# Patient Record
Sex: MALE | Race: WHITE | NOT HISPANIC OR LATINO | Employment: OTHER | URBAN - METROPOLITAN AREA
[De-identification: names, ages, dates, MRNs, and addresses within clinical notes are randomized per-mention and may not be internally consistent; named-entity substitution may affect disease eponyms.]

---

## 2024-08-27 ENCOUNTER — HOSPITAL ENCOUNTER (OUTPATIENT)
Facility: HOSPITAL | Age: 69
Setting detail: RADIATION/ONCOLOGY SERIES
End: 2024-08-27
Payer: MEDICARE

## 2024-08-30 ENCOUNTER — OFFICE VISIT (OUTPATIENT)
Age: 69
End: 2024-08-30
Payer: MEDICARE

## 2024-08-30 VITALS
DIASTOLIC BLOOD PRESSURE: 68 MMHG | BODY MASS INDEX: 32.29 KG/M2 | HEIGHT: 69 IN | WEIGHT: 218 LBS | SYSTOLIC BLOOD PRESSURE: 102 MMHG | TEMPERATURE: 98.1 F | HEART RATE: 92 BPM | OXYGEN SATURATION: 97 % | RESPIRATION RATE: 14 BRPM

## 2024-08-30 DIAGNOSIS — C61 PROSTATE CANCER: Primary | ICD-10-CM

## 2024-08-30 PROCEDURE — G0463 HOSPITAL OUTPT CLINIC VISIT: HCPCS

## 2024-08-30 RX ORDER — LEVOCETIRIZINE DIHYDROCHLORIDE 5 MG/1
1 TABLET, FILM COATED ORAL DAILY
COMMUNITY
Start: 2024-07-08

## 2024-08-30 RX ORDER — TAMSULOSIN HYDROCHLORIDE 0.4 MG/1
1 CAPSULE ORAL DAILY
COMMUNITY
Start: 2024-07-08

## 2024-08-30 RX ORDER — ASPIRIN 81 MG/1
1 TABLET ORAL DAILY
COMMUNITY

## 2024-08-30 RX ORDER — OMEPRAZOLE 40 MG/1
1 CAPSULE, DELAYED RELEASE ORAL DAILY
COMMUNITY
Start: 2024-07-08

## 2024-08-30 RX ORDER — LISINOPRIL 40 MG/1
1 TABLET ORAL DAILY
COMMUNITY

## 2024-08-30 RX ORDER — FLUTICASONE PROPIONATE 50 MCG
SPRAY, SUSPENSION (ML) NASAL
COMMUNITY
Start: 2024-06-19

## 2024-08-30 RX ORDER — PAROXETINE 30 MG/1
1 TABLET, FILM COATED ORAL DAILY
COMMUNITY

## 2024-08-30 NOTE — PROGRESS NOTES
CONSULTATION NOTE      :                                                          1955  DATE OF CONSULTATION:                       2024   REQUESTING PHYSICIAN:                   Peng Cortez MD  REASON FOR CONSULTATION:           Prostate cancer  - Stage IIC (cT1c, cN0, cM0, PSA: 9.1, Grade Group: 4)         BRIEF HISTORY:  The patient is a very pleasant 68 y.o. male  with recent diagnosis of prostate cancer.  He presented with rising PSA values of 7.7 ng/ml on 2024 and 9.13 ng/ml on 3/28/2024.  4K score showed high risk.  MRI 2024 showed a heterogeneous 47 g prostate.  There was an 8 x 7 mm PI-RADS 4 suspicious index lesion at the lateral right peripheral zone at mid gland.  MRI targeted biopsy was performed 2024.  The region of interest at the right anterior lateral gland showed prostatic adenocarcinoma, [place]'s grade 4+4 = 8 involving 3 out of 3 cores, 80% tissue.  No evidence of perineural invasion.  An additional 1 out of 5 random cores from the right lobe contained Reeves's 4+4 = 8 also from the right anterior lateral gland involving 60% tissue.  0 out of 5 random cores from the left lobe contained tumor.  Nuclear medicine bone scan showed no evidence of metastatic disease.    He is otherwise fairly healthy and should have predicted longevity greater than 10 years.  He is interested in definitive nonsurgical treatment and would not like to undergo a long course of treatment with radiotherapy since caring for his wife who has other medical issues.    Allergy: No Known Allergies    Social History:   Social History     Socioeconomic History    Marital status:    Tobacco Use    Smoking status: Never    Smokeless tobacco: Current     Types: Chew    Tobacco comments:     3 chews per day x 20 years   Vaping Use    Vaping status: Never Used   Substance and Sexual Activity    Alcohol use: Not Currently    Drug use: Never    Sexual activity: Defer     Partners: Female  "      Past Medical History:   Past Medical History:   Diagnosis Date    Arthritis     Diabetes mellitus     Diabetes mellitus     History of bladder problems     History of kidney infection     Hypertension     Prostate cancer        Family History: family history includes Cancer in his brother.     Surgical History:   Past Surgical History:   Procedure Laterality Date    COLONOSCOPY  2022    PROSTATE BIOPSY  07/2024        Review of Systems:   Review of Systems   Constitutional:  Positive for fatigue and unexpected weight change (5 pounds in 2 month).   HENT:   Positive for hearing loss and tinnitus.         Sinus drainage     Genitourinary:  Positive for difficulty urinating (hesitancy, urgency,), frequency and nocturia.         Flank pain     Musculoskeletal:  Positive for arthralgias, back pain and myalgias.   Skin:  Positive for itching and rash.   Neurological:  Positive for numbness.   Psychiatric/Behavioral:          Memory loss           Objective   VITAL SIGNS:   Vitals:    08/30/24 1003   BP: 102/68   Pulse: 92   Resp: 14   Temp: 98.1 °F (36.7 °C)   TempSrc: Oral   SpO2: 97%   Weight: 98.9 kg (218 lb)   Height: 175.3 cm (69\")   PainSc:   3   PainLoc: Back        Karnofsky score: 100     IPSS Questionnaire (AUA-7):  Over the past month…    1)  Incomplete Emptying  How often have you had a sensation of not emptying your bladder?  3 - About half the time   2)  Frequency  How often have you had to urinate less than every two hours? 3 - About half the time   3)  Intermittency  How often have you found you stopped and started again several times when you urinated?  2 - Less than half the time   4) Urgency  How often have you found it difficult to postpone urination?  1 - Less than 1 time in 5   5) Weak Stream  How often have you had a weak urinary stream?  1 - Less than 1 time in 5   6) Straining  How often have you had to push or strain to begin urination?  1 - Less than 1 time in 5   7) Nocturia  How many " times did you typically get up at night to urinate?  2 - 2 times   Total Score:  13       Quality of life due to urinary symptoms:  If you were to spend the rest of your life with your urinary condition the way it is now, how would you feel about that? 4-Mostly Dissatisfied   Urine Leakage (Incontinence) 0-No Leakage     Sexual Health Inventory  Current Status    1)  How do you rate your confidence that you could achieve and keep an erection? 1-Very Low   2) When you had erections with sexual stimulation, how often were your erections hard enough for penetration (entering your partner)? 3-Sometime (about half the time)   3)  During sexual intercourse, how often were you able to maintain your erection after you had penetrated (entered) into your partner? 2-A few times (much less than half the time)   4) During sexual intercourse, how difficult was it to maintain your erection to completion of intercourse? 2-Very difficult   5) When you attempted sexual intercourse, how often was it satisfactory to you? 2-A few times (much less than half the time)   Total Score: 14       Bowel Health Inventory  Current Status: 0-No problems, no rectal bleeding, no discharge, less then 5 bowel movements a day         Physical Exam:   Physical Exam  Vitals and nursing note reviewed.   Constitutional:       Appearance: He is well-developed.   HENT:      Head: Normocephalic and atraumatic.   Cardiovascular:      Rate and Rhythm: Normal rate and regular rhythm.      Heart sounds: Normal heart sounds. No murmur heard.  Pulmonary:      Effort: Pulmonary effort is normal.      Breath sounds: Normal breath sounds. No wheezing or rales.   Abdominal:      General: Bowel sounds are normal. There is no distension.      Palpations: Abdomen is soft.      Tenderness: There is no abdominal tenderness.   Genitourinary:     Prostate: Enlarged (40 cc, symmetrically enlarged, smooth, no nodules or induration.  Seminal vesicles are nonpalpable.). Not  tender and no nodules present.      Rectum: No mass, tenderness, anal fissure, external hemorrhoid or internal hemorrhoid. Normal anal tone.   Musculoskeletal:         General: No tenderness. Normal range of motion.      Cervical back: Normal range of motion and neck supple.   Lymphadenopathy:      Cervical: No cervical adenopathy.      Upper Body:      Right upper body: No supraclavicular adenopathy.      Left upper body: No supraclavicular adenopathy.   Skin:     General: Skin is warm and dry.   Neurological:      Mental Status: He is alert and oriented to person, place, and time.      Sensory: No sensory deficit.   Psychiatric:         Behavior: Behavior normal.         Thought Content: Thought content normal.         Judgment: Judgment normal.              The following portions of the patient's history were reviewed and updated as appropriate: allergies, current medications, past family history, past medical history, past social history, past surgical history, and problem list.    Assessment:   Assessment      Prostate cancer, [place]'s 4+4 = 8, clinical stage IIC (T1c, M0, M0) PSA 9.13 ng/ml.  He has a small focus of higher grade neoplasm confined within the prostate gland.  We reviewed the radiotherapy treatment options using stereotactic body radiotherapy versus intensity-modulated radiotherapy or brachytherapy.  Patient would like to proceed with stereotactic body radiotherapy.  CyberKnife treatment procedures been reviewed in detail.  All questions were answered.  Form consent was obtained.    RECOMMENDATIONS: Gold seed fiducial placement will be arranged.  Patient will subsequently return for treatment planning CT and MRI pelvis.  The prostate gland and proximal seminal vesicles will receive 5 fractions of 7 Gray each, delivered on every other day treatment schedule.    I spent a total of 55 minutes on todays visit, with more than 40 minutes in direct face to face communication, and the remainder of the  time spent in reviewing the relevant history, records, available imaging, and for documentation.    Follow Up:   Return in about 4 weeks (around 9/27/2024) for Office Visit.  Diagnoses and all orders for this visit:    1. Prostate cancer (Primary)    Other orders  -     LABS SCANNED  -     SCANNED PATHOLOGY  -     IMAGING SCANNED  -     IMAGING SCANNED  -     IMAGING SCANNED  -     IMAGING SCANNED         Torsten Nguyen MD

## 2024-09-16 DIAGNOSIS — C61 PROSTATE CANCER: Primary | ICD-10-CM

## 2024-09-30 ENCOUNTER — DOCUMENTATION (OUTPATIENT)
Dept: OTHER | Facility: HOSPITAL | Age: 69
End: 2024-09-30
Payer: MEDICARE

## 2024-09-30 NOTE — PROGRESS NOTES
Distress Screening Follow-up    Name: Cullen Harris    : 1955    Diagnosis: Prostate Cancer    Location of Distress Screening: Radiation Oncology    Distress Level: 5 (2024 11:00 AM)      Physical Concerns:  Fatigue: Y  Tobacco use: Y  Memory or concentration: Y  Loss or change of physical abilities: Y      Emotional Concerns:  Loss of interest or enjoyment: Y     Interventions:        Comments:  RUSS called to follow up with pt regarding recent distress screen results. SW left a VM with pt introducing self, and offering additional support. SW provided call back number, ([phone]) and encouraged pt to reach out at his convenience.     SW will remain available to offer support.     JAIME Khan  Oncology Social Worker

## 2024-10-10 ENCOUNTER — DOCUMENTATION (OUTPATIENT)
Dept: NUTRITION | Facility: HOSPITAL | Age: 69
End: 2024-10-10
Payer: MEDICARE

## 2024-10-11 ENCOUNTER — HOSPITAL ENCOUNTER (OUTPATIENT)
Dept: RADIATION ONCOLOGY | Facility: HOSPITAL | Age: 69
Setting detail: RADIATION/ONCOLOGY SERIES
Discharge: HOME | End: 2024-10-11
Payer: MEDICARE

## 2024-10-11 ENCOUNTER — OFFICE VISIT (OUTPATIENT)
Dept: RADIATION ONCOLOGY | Facility: HOSPITAL | Age: 69
End: 2024-10-11
Payer: MEDICARE

## 2024-10-11 DIAGNOSIS — C61 PROSTATE CANCER: Primary | ICD-10-CM

## 2024-10-11 NOTE — PROGRESS NOTES
RE-EVALUATION    PATIENT:                                                      Cullen Harris  :                                                          1955  DATE:                          10/11/2024   DIAGNOSIS:     Prostate cancer  - Stage IIC (cT1c, cN0, cM0, PSA: 9.1, Grade Group: 4)    This visit has been converted to a telehealth virtual visit, the patient's preferred method for today's visit.  Total time of discussion was 15 minutes.  The patient has given verbal consent.       BRIEF HISTORY:  The patient is a very pleasant 69 y.o. male with recent diagnosis of prostate cancer.  He presented with rising PSA values of 7.7 ng/ml on 2024 and 9.13 ng/ml on 3/28/2024.  4K score showed high risk.  MRI 2024 showed a heterogeneous 47 g prostate.  There was an 8 x 7 mm PI-RADS 4 suspicious index lesion at the lateral right peripheral zone at mid gland.  MRI targeted biopsy was performed 2024.  The region of interest at the right anterior lateral gland showed prostatic adenocarcinoma, [place]'s grade 4+4 = 8 involving 3 out of 3 cores, 80% tissue.  No evidence of perineural invasion.  An additional 1 out of 5 random cores from the right lobe contained [place]'s 4+4 = 8 also from the right anterior lateral gland involving 60% tissue.  0 out of 5 random cores from the left lobe contained tumor.  Nuclear medicine bone scan showed no evidence of metastatic disease.  He is otherwise fairly healthy and should have predicted longevity greater than 10 years.  He is interested in definitive nonsurgical treatment and would not like to undergo a long course of treatment with radiotherapy since caring for his wife who has other medical issues.  He chose to undergo definitive treatment with CyberKnife stereotactic body radiotherapy.  He tolerated gold seed fiducial placement without difficulty.  No change in voiding.  No hematuria.  No dysuria.  He has had no other change in health since informed consent was  obtained on 8/30/2024.    He remains a good candidate for SBRT.       IPSS Questionnaire (AUA-7):  Over the past month…     1)  Incomplete Emptying  How often have you had a sensation of not emptying your bladder?  3 - About half the time   2)  Frequency  How often have you had to urinate less than every two hours? 3 - About half the time   3)  Intermittency  How often have you found you stopped and started again several times when you urinated?  2 - Less than half the time   4) Urgency  How often have you found it difficult to postpone urination?  1 - Less than 1 time in 5   5) Weak Stream  How often have you had a weak urinary stream?  1 - Less than 1 time in 5   6) Straining  How often have you had to push or strain to begin urination?  1 - Less than 1 time in 5   7) Nocturia  How many times did you typically get up at night to urinate?  2 - 2 times   Total Score:  13         Quality of life due to urinary symptoms:  If you were to spend the rest of your life with your urinary condition the way it is now, how would you feel about that? 4-Mostly Dissatisfied   Urine Leakage (Incontinence) 0-No Leakage      Sexual Health Inventory  Current Status     1)  How do you rate your confidence that you could achieve and keep an erection? 1-Very Low   2) When you had erections with sexual stimulation, how often were your erections hard enough for penetration (entering your partner)? 3-Sometime (about half the time)   3)  During sexual intercourse, how often were you able to maintain your erection after you had penetrated (entered) into your partner? 2-A few times (much less than half the time)   4) During sexual intercourse, how difficult was it to maintain your erection to completion of intercourse? 2-Very difficult   5) When you attempted sexual intercourse, how often was it satisfactory to you? 2-A few times (much less than half the time)   Total Score: 14         Bowel Health Inventory  Current Status: 0-No problems, no  rectal bleeding, no discharge, less then 5 bowel movements a day        No Known Allergies    Review of Systems - Oncology   Constitutional:  Positive for fatigue and unexpected weight change (5 pounds in 2 month).   HENT:   Positive for hearing loss and tinnitus.         Sinus drainage  Genitourinary:  Positive for difficulty urinating (hesitancy, urgency,), frequency and nocturia.    Musculoskeletal:  Positive for arthralgias, back pain and myalgias.   Skin:  Positive for itching and rash.   Neurological:  Positive for numbness.        %    Objective       Physical Exam  Pulmonary:      Respirations even, unlabored. No audible wheezing or cough.  Neurological:      A+Ox4, conversant, answers questions appropriately.  Psychiatric:     Judgement, affect, and decision-making WNL.    Limited physical exam as visit was conducted remotely via telephone.           The following portions of the patient's history were reviewed and updated as appropriate: allergies, current medications, past family history, past medical history, past social history, past surgical history, and problem list.    Diagnoses and all orders for this visit:    Prostate cancer        IMPRESSION:    Prostate cancer, [place]'s 4+4 = 8, clinical stage IIC (T1c, M0, M0) PSA 9.13 ng/ml.  He has a small focus of higher grade neoplasm confined within the prostate gland.    RECOMMENDATIONS:  Return for treatment planning CT and MRI pelvis which will be performed 10/14/2024.  The prostate gland and proximal seminal vesicles will receive 5 fractions of 7 Gray each, delivered on every other day treatment schedule, on the CyberKnife.   He verbalizes understanding of all recommended dietary and bowel prep, activity restrictions, and continuation of alpha blocker.           CAMMIE Mauricio    A total of 30 minutes was spent on today's encounter, with 15 minutes in virtual communication with the patient via telephone, and the remainder of the time  spent in reviewing the relevant history, records, available imaging, and for documentation.

## 2024-10-14 ENCOUNTER — HOSPITAL ENCOUNTER (OUTPATIENT)
Dept: RADIATION ONCOLOGY | Facility: HOSPITAL | Age: 69
Discharge: HOME | End: 2024-10-14
Payer: MEDICARE

## 2024-10-14 ENCOUNTER — HOSPITAL ENCOUNTER (OUTPATIENT)
Dept: MRI IMAGING | Facility: HOSPITAL | Age: 69
Discharge: HOME | End: 2024-10-14
Admitting: RADIOLOGY
Payer: MEDICARE

## 2024-10-14 DIAGNOSIS — C61 PROSTATE CANCER: ICD-10-CM

## 2024-10-14 PROCEDURE — 72195 MRI PELVIS W/O DYE: CPT

## 2024-10-14 PROCEDURE — 77399 UNLISTED PX MED RADJ PHYSICS: CPT | Performed by: RADIOLOGY

## 2024-10-16 ENCOUNTER — DOCUMENTATION (OUTPATIENT)
Dept: RADIATION ONCOLOGY | Facility: HOSPITAL | Age: 69
End: 2024-10-16
Payer: MEDICARE

## 2024-10-16 DIAGNOSIS — C61 PROSTATE CANCER: Primary | ICD-10-CM

## 2024-10-16 NOTE — PROGRESS NOTES
Patient has a diagnosis of clinical localized but higher grade prostate cancer.  He chose to undergo definitive treatment with CyberKnife stereotactic body radiotherapy.  He recently underwent treatment planning imaging studies including MRI pelvis.  This showed:    Enlarging right obturator lymph node with somewhat rounded morphology measuring 1.3 x 1.1 cm previously 1 x 0.6 cm on 4/12/2024.   This was concerning for possible regional elan metastasis.  I discussed this with the patient.    Plan:  PSMA PET/CT will be performed prior to stereotactic body radiotherapy treatment of the prostate gland, for more complete staging and evaluation of the pelvic lymph node.

## 2024-10-17 ENCOUNTER — TELEPHONE (OUTPATIENT)
Dept: RADIATION ONCOLOGY | Facility: HOSPITAL | Age: 69
End: 2024-10-17
Payer: MEDICARE

## 2024-10-17 NOTE — TELEPHONE ENCOUNTER
Attempted to contact pt regarding upcoming appointments.  Left detailed message.  Instructed to call back with any questions. MyChart message also sent to pt.

## 2024-10-31 ENCOUNTER — HOSPITAL ENCOUNTER (OUTPATIENT)
Facility: HOSPITAL | Age: 69
Discharge: HOME | End: 2024-10-31
Payer: MEDICARE

## 2024-10-31 DIAGNOSIS — C61 PROSTATE CANCER: ICD-10-CM

## 2024-10-31 PROCEDURE — 0 PIFLUFOLASTAT F 18 9 MCI SOLUTION PREFILLED SYRINGE: Performed by: RADIOLOGY

## 2024-10-31 PROCEDURE — A9595 PIFLUFOLASTAT F 18 9 MCI SOLUTION PREFILLED SYRINGE: HCPCS | Performed by: RADIOLOGY

## 2024-10-31 PROCEDURE — 78815 PET IMAGE W/CT SKULL-THIGH: CPT

## 2024-10-31 RX ADMIN — PIFLUFOLASTAT F-18 1 DOSE: 80 INJECTION INTRAVENOUS at 13:39

## 2024-11-01 ENCOUNTER — HOSPITAL ENCOUNTER (OUTPATIENT)
Dept: RADIATION ONCOLOGY | Facility: HOSPITAL | Age: 69
Setting detail: RADIATION/ONCOLOGY SERIES
Discharge: HOME | End: 2024-11-01
Payer: MEDICARE

## 2024-11-05 ENCOUNTER — OFFICE VISIT (OUTPATIENT)
Dept: RADIATION ONCOLOGY | Facility: HOSPITAL | Age: 69
End: 2024-11-05
Payer: MEDICARE

## 2024-11-05 VITALS
RESPIRATION RATE: 20 BRPM | BODY MASS INDEX: 32.43 KG/M2 | HEART RATE: 61 BPM | TEMPERATURE: 97.2 F | WEIGHT: 219.6 LBS | DIASTOLIC BLOOD PRESSURE: 75 MMHG | SYSTOLIC BLOOD PRESSURE: 120 MMHG | OXYGEN SATURATION: 95 %

## 2024-11-05 DIAGNOSIS — C61 PROSTATE CANCER: Primary | ICD-10-CM

## 2024-11-05 PROCEDURE — G0463 HOSPITAL OUTPT CLINIC VISIT: HCPCS

## 2024-11-05 NOTE — PROGRESS NOTES
RE-EVALUATION    PATIENT:                                                      Cullen Harris  :                                                          1955  DATE:                          2024   DIAGNOSIS:     Prostate cancer  - Stage IIC (cT1c, cN0, cM0, PSA: 9.1, Grade Group: 4)         BRIEF HISTORY:  The patient is a very pleasant 69 y.o. male  with high risk prostate cancer.  Patient had wanted to undergo stereotactic body radiotherapy as his definitive treatment.  He underwent placement of gold fiducials.  The treatment planning imaging including MRI pelvis identified a suspicious right pelvic lymph node.  PSMA PET/CT evaluation 10/31/2024 showed uptake in the right peripheral zone of the prostate gland with SUV 73.9.  There was also hypermetabolism in a solitary 0.9 cm right external iliac lymph node, SUV 84 consistent with lymph node metastasis.    No Known Allergies    Review of Systems   Constitutional:  Positive for fatigue.   HENT:   Positive for hearing loss.    Genitourinary:  Positive for frequency.    Musculoskeletal:  Positive for back pain.   All other systems reviewed and are negative.        IPSS Questionnaire (AUA-7):  Over the past month…     1)  Incomplete Emptying  How often have you had a sensation of not emptying your bladder?  3 - About half the time   2)  Frequency  How often have you had to urinate less than every two hours? 3 - About half the time   3)  Intermittency  How often have you found you stopped and started again several times when you urinated?  2 - Less than half the time   4) Urgency  How often have you found it difficult to postpone urination?  1 - Less than 1 time in 5   5) Weak Stream  How often have you had a weak urinary stream?  1 - Less than 1 time in 5   6) Straining  How often have you had to push or strain to begin urination?  1 - Less than 1 time in 5   7) Nocturia  How many times did you typically get up at night to urinate?  2 - 2 times   Total  Score:  13         Quality of life due to urinary symptoms:  If you were to spend the rest of your life with your urinary condition the way it is now, how would you feel about that? 4-Mostly Dissatisfied   Urine Leakage (Incontinence) 0-No Leakage      Sexual Health Inventory  Current Status     1)  How do you rate your confidence that you could achieve and keep an erection? 1-Very Low   2) When you had erections with sexual stimulation, how often were your erections hard enough for penetration (entering your partner)? 3-Sometime (about half the time)   3)  During sexual intercourse, how often were you able to maintain your erection after you had penetrated (entered) into your partner? 2-A few times (much less than half the time)   4) During sexual intercourse, how difficult was it to maintain your erection to completion of intercourse? 2-Very difficult   5) When you attempted sexual intercourse, how often was it satisfactory to you? 2-A few times (much less than half the time)   Total Score: 14         Bowel Health Inventory  Current Status: 0-No problems, no rectal bleeding, no discharge, less then 5 bowel movements a day              Objective   VITAL SIGNS:   Vitals:    11/05/24 1107   BP: 120/75   Pulse: 61   Resp: 20   Temp: 97.2 °F (36.2 °C)   TempSrc: Skin   SpO2: 95%   Weight: 99.6 kg (219 lb 9.6 oz)   PainSc: 0-No pain            KSP %:  80-90    Physical Exam  Vitals and nursing note reviewed.   Constitutional:       Appearance: He is well-developed.   HENT:      Head: Normocephalic and atraumatic.   Cardiovascular:      Rate and Rhythm: Normal rate and regular rhythm.      Heart sounds: Normal heart sounds. No murmur heard.  Pulmonary:      Effort: Pulmonary effort is normal.      Breath sounds: Normal breath sounds. No wheezing or rales.   Abdominal:      General: Bowel sounds are normal. There is no distension.      Palpations: Abdomen is soft.      Tenderness: There is no abdominal tenderness.    Musculoskeletal:         General: No tenderness. Normal range of motion.      Cervical back: Normal range of motion and neck supple.   Lymphadenopathy:      Cervical: No cervical adenopathy.      Upper Body:      Right upper body: No supraclavicular adenopathy.      Left upper body: No supraclavicular adenopathy.   Skin:     General: Skin is warm and dry.   Neurological:      Mental Status: He is alert and oriented to person, place, and time.      Sensory: No sensory deficit.   Psychiatric:         Behavior: Behavior normal.         Thought Content: Thought content normal.         Judgment: Judgment normal.              The following portions of the patient's history were reviewed and updated as appropriate: allergies, current medications, past family history, past medical history, past social history, past surgical history, and problem list.    Diagnoses and all orders for this visit:    Prostate cancer      IMPRESSION: Prostate cancer, [place]'s 4+4 = 8, clinical stage based on MRI and PSMA PET showing a solitary right external iliac lymph node metastasis is now CARLY (T1c, N1, M0), PSA 9.13 ng/ml.  This would be best treated with moderately hypofractionated IMRT pelvic irradiation and concurrent short course androgen ablation.  Patient was reconsented for IMRT radiotherapy.    RECOMMENDATIONS: 6-month LHRH agonist injection will be coordinated.  The prostate gland and pathologic right iliac lymph node will be treated to a dose of 70 Gray delivered in 28 daily fractions over 5 and half weeks.  Concurrently, the remainder of the pelvic lymph nodes will receive a minimum dose of 50 [place].  Patient would like to be treated in the [place] location, closer to home.  Simulation will be arranged.         Torsten Nguyen MD    Approximate 35 minutes was spent reviewing records, imaging studies, discussing with patient, and for documentation.

## 2024-11-06 ENCOUNTER — TELEPHONE (OUTPATIENT)
Age: 69
End: 2024-11-06
Payer: MEDICARE

## 2024-11-06 NOTE — TELEPHONE ENCOUNTER
Patient called to inform nursing that he is scheduled to receive a LHRH injection with Dr. Bowden on 11/12/24. SIM scheduled for 11/14/24 @ [place] location at 2:00pm. Patient understands location for appt.

## 2024-11-12 ENCOUNTER — HOSPITAL ENCOUNTER (OUTPATIENT)
Facility: HOSPITAL | Age: 69
Setting detail: RADIATION/ONCOLOGY SERIES
End: 2024-11-12
Payer: MEDICARE

## 2024-11-14 ENCOUNTER — HOSPITAL ENCOUNTER (OUTPATIENT)
Facility: HOSPITAL | Age: 69
Discharge: HOME | End: 2024-11-14

## 2024-11-14 PROCEDURE — 77399 UNLISTED PX MED RADJ PHYSICS: CPT | Performed by: RADIOLOGY

## 2024-11-15 ENCOUNTER — DOCUMENTATION (OUTPATIENT)
Dept: OTHER | Facility: HOSPITAL | Age: 69
End: 2024-11-15
Payer: MEDICARE

## 2024-11-15 NOTE — PROGRESS NOTES
SW contacted to pt to provide support, introductions, and follow up concerning lodging/transportation for upcoming treatments. SW provided introductions due to pt changing his treatment from [facility] to [facility]. Pt communicated to SW that he just had his markers placed and that he has been doing well. SW informed pt he is eligible to stay at [facility] if he would like to remain in [place] for his treatments. Pt stated he would rather drive to than stay in [place]. SW voiced understanding and encouraged pt to reach out if he changes his mind and would like to stay at [facility] in the future. Pt thanked RUSS for the call and denied any needs at this time. SW provided contact information and pt agreed to reach out should any needs arise. RUSS will continue to be available for ongoing support and assistance.

## 2024-11-20 PROCEDURE — 77338 DESIGN MLC DEVICE FOR IMRT: CPT | Performed by: RADIOLOGY

## 2024-11-20 PROCEDURE — 77293 RESPIRATOR MOTION MGMT SIMUL: CPT | Performed by: RADIOLOGY

## 2024-11-20 PROCEDURE — 77301 RADIOTHERAPY DOSE PLAN IMRT: CPT | Performed by: RADIOLOGY

## 2024-11-20 PROCEDURE — 77300 RADIATION THERAPY DOSE PLAN: CPT | Performed by: RADIOLOGY

## 2024-11-25 ENCOUNTER — HOSPITAL ENCOUNTER (OUTPATIENT)
Facility: HOSPITAL | Age: 69
Discharge: HOME | End: 2024-11-25
Payer: MEDICARE

## 2024-11-25 LAB
RAD ONC ARIA COURSE ID: 1
RAD ONC ARIA COURSE INTENT: NORMAL
RAD ONC ARIA COURSE LAST TREATMENT DATE: NORMAL
RAD ONC ARIA COURSE START DATE: NORMAL
RAD ONC ARIA COURSE TREATMENT ELAPSED DAYS: 0
RAD ONC ARIA FIRST TREATMENT DATE: NORMAL
RAD ONC ARIA PLAN FRACTIONS TREATED TO DATE: 1
RAD ONC ARIA PLAN ID: NORMAL
RAD ONC ARIA PLAN NAME: NORMAL
RAD ONC ARIA PLAN PRESCRIBED DOSE PER FRACTION: 2.5 GY
RAD ONC ARIA PLAN PRIMARY REFERENCE POINT: NORMAL
RAD ONC ARIA PLAN TOTAL FRACTIONS PRESCRIBED: 28
RAD ONC ARIA PLAN TOTAL PRESCRIBED DOSE: 7000 CGY
RAD ONC ARIA REFERENCE POINT DOSAGE GIVEN TO DATE: 1.8 GY
RAD ONC ARIA REFERENCE POINT DOSAGE GIVEN TO DATE: 2.5 GY
RAD ONC ARIA REFERENCE POINT ID: NORMAL
RAD ONC ARIA REFERENCE POINT ID: NORMAL
RAD ONC ARIA REFERENCE POINT SESSION DOSAGE GIVEN: 1.8 GY
RAD ONC ARIA REFERENCE POINT SESSION DOSAGE GIVEN: 2.5 GY

## 2024-11-25 PROCEDURE — 77385: CPT | Performed by: RADIOLOGY

## 2024-11-26 ENCOUNTER — HOSPITAL ENCOUNTER (OUTPATIENT)
Facility: HOSPITAL | Age: 69
Discharge: HOME | End: 2024-11-26

## 2024-11-26 LAB
RAD ONC ARIA COURSE ID: 1
RAD ONC ARIA COURSE INTENT: NORMAL
RAD ONC ARIA COURSE LAST TREATMENT DATE: NORMAL
RAD ONC ARIA COURSE START DATE: NORMAL
RAD ONC ARIA COURSE TREATMENT ELAPSED DAYS: 1
RAD ONC ARIA FIRST TREATMENT DATE: NORMAL
RAD ONC ARIA PLAN FRACTIONS TREATED TO DATE: 2
RAD ONC ARIA PLAN ID: NORMAL
RAD ONC ARIA PLAN NAME: NORMAL
RAD ONC ARIA PLAN PRESCRIBED DOSE PER FRACTION: 2.5 GY
RAD ONC ARIA PLAN PRIMARY REFERENCE POINT: NORMAL
RAD ONC ARIA PLAN TOTAL FRACTIONS PRESCRIBED: 28
RAD ONC ARIA PLAN TOTAL PRESCRIBED DOSE: 7000 CGY
RAD ONC ARIA REFERENCE POINT DOSAGE GIVEN TO DATE: 3.6 GY
RAD ONC ARIA REFERENCE POINT DOSAGE GIVEN TO DATE: 5 GY
RAD ONC ARIA REFERENCE POINT ID: NORMAL
RAD ONC ARIA REFERENCE POINT ID: NORMAL
RAD ONC ARIA REFERENCE POINT SESSION DOSAGE GIVEN: 1.8 GY
RAD ONC ARIA REFERENCE POINT SESSION DOSAGE GIVEN: 2.5 GY

## 2024-11-26 PROCEDURE — 77385: CPT | Performed by: RADIOLOGY

## 2024-11-27 ENCOUNTER — HOSPITAL ENCOUNTER (OUTPATIENT)
Facility: HOSPITAL | Age: 69
Discharge: HOME | End: 2024-11-27

## 2024-11-27 ENCOUNTER — DOCUMENTATION (OUTPATIENT)
Dept: NUTRITION | Facility: HOSPITAL | Age: 69
End: 2024-11-27
Payer: MEDICARE

## 2024-11-27 VITALS — WEIGHT: 218 LBS | BODY MASS INDEX: 32.19 KG/M2

## 2024-11-27 LAB
RAD ONC ARIA COURSE ID: 1
RAD ONC ARIA COURSE INTENT: NORMAL
RAD ONC ARIA COURSE LAST TREATMENT DATE: NORMAL
RAD ONC ARIA COURSE START DATE: NORMAL
RAD ONC ARIA COURSE TREATMENT ELAPSED DAYS: 2
RAD ONC ARIA FIRST TREATMENT DATE: NORMAL
RAD ONC ARIA PLAN FRACTIONS TREATED TO DATE: 3
RAD ONC ARIA PLAN ID: NORMAL
RAD ONC ARIA PLAN NAME: NORMAL
RAD ONC ARIA PLAN PRESCRIBED DOSE PER FRACTION: 2.5 GY
RAD ONC ARIA PLAN PRIMARY REFERENCE POINT: NORMAL
RAD ONC ARIA PLAN TOTAL FRACTIONS PRESCRIBED: 28
RAD ONC ARIA PLAN TOTAL PRESCRIBED DOSE: 7000 CGY
RAD ONC ARIA REFERENCE POINT DOSAGE GIVEN TO DATE: 5.4 GY
RAD ONC ARIA REFERENCE POINT DOSAGE GIVEN TO DATE: 7.5 GY
RAD ONC ARIA REFERENCE POINT ID: NORMAL
RAD ONC ARIA REFERENCE POINT ID: NORMAL
RAD ONC ARIA REFERENCE POINT SESSION DOSAGE GIVEN: 1.8 GY
RAD ONC ARIA REFERENCE POINT SESSION DOSAGE GIVEN: 2.5 GY

## 2024-11-27 PROCEDURE — 77385: CPT | Performed by: RADIOLOGY

## 2024-11-27 RX ORDER — HYDROCHLOROTHIAZIDE 12.5 MG/1
12.5 TABLET ORAL DAILY
COMMUNITY
Start: 2019-01-25

## 2024-11-27 NOTE — PROGRESS NOTES
"Outpatient Oncology Nutrition     Reason for Visit:     Oncology Nutrition Screening and Patient Education    Patient Name:  Cullen Harris    :  1955    MRN:  [id number]    Date of Encounter: 2024    Nutrition Assessment     Diagnosis: Prostate Cancer    Radiation: 70 Hernandez delivered in 28 daily fractions     Patient Active Problem List:    Patient Active Problem List   Diagnosis    Prostate cancer       Food / Nutrition Related History   Patient reports he typically drinks water, diet soda, coffee and sometimes some gatorade.     Hydration Status   Discussed the importance of hydration, reviewed hydrating fluids, and recommended he increase his intake.    Goal: 100+ ounces     Enteral Feeding       Anthropometric Measurements     Height:    Ht Readings from Last 1 Encounters:   24 175.3 cm (69\")       Weight:    Wt Readings from Last 1 Encounters:   24 99.6 kg (219 lb 9.6 oz)       BMI:  32.43 Obese    Weight Change:  24- 218#  24- 219#  24- 218#    Review of Lab Data (Time Frame - 1 month / 2 month)   No recent lab work    Medication Review   Reviewed MAR 11/27/24; metformin, vit D    Nutrition Focused Physical Findings       Nutrition Impact Symptoms      Fatigue      Physical Activity      Normal with no limitations    Current Nutritional Intake     Oral diet: Regular    Oral nutritional supplements: none indicated    Intake: oral intake has been normal    Malnutrition Risk Assessment     Recent weight loss over the past 6 months:  0 = No    Eating poorly because of a decreased appetite:  0 = No    Malnutrition Screening Score:     MST = 0 or 1 Patient not at risk for malnutrition    Nutrition Diagnosis     Problem    Etiology    Signs / Symptoms      Nutrition Intervention   Reviewed the importance of good nutrition during his treatment course focusing on adequate calorie, protein, nutrient and fluid intake. Advised him to be consuming smaller more frequent meals/snacks " throughout the day to aid with potential nausea management. Emphasized the importance of protein and its role in the diet; reviewed high protein foods; and recommended he have a protein source at each meal/snack. He denies the need for written diet materials at this time.      Goal   To achieve adequate nutritional and hydration intake.    To aid with nutrition impact symptom management as needed.    Monitoring / Evaluation   Answered his questions and he voiced understanding of information discussed.  RD's contact information provided and encouraged to call with questions. Will follow up as indicated.    Ana Reynoso RD, LD

## 2024-12-02 ENCOUNTER — HOSPITAL ENCOUNTER (OUTPATIENT)
Facility: HOSPITAL | Age: 69
Discharge: HOME | End: 2024-12-02
Payer: MEDICARE

## 2024-12-02 ENCOUNTER — HOSPITAL ENCOUNTER (OUTPATIENT)
Facility: HOSPITAL | Age: 69
Setting detail: RADIATION/ONCOLOGY SERIES
End: 2024-12-02
Payer: MEDICARE

## 2024-12-02 LAB
RAD ONC ARIA COURSE ID: 1
RAD ONC ARIA COURSE INTENT: NORMAL
RAD ONC ARIA COURSE LAST TREATMENT DATE: NORMAL
RAD ONC ARIA COURSE START DATE: NORMAL
RAD ONC ARIA COURSE TREATMENT ELAPSED DAYS: 7
RAD ONC ARIA FIRST TREATMENT DATE: NORMAL
RAD ONC ARIA PLAN FRACTIONS TREATED TO DATE: 4
RAD ONC ARIA PLAN ID: NORMAL
RAD ONC ARIA PLAN NAME: NORMAL
RAD ONC ARIA PLAN PRESCRIBED DOSE PER FRACTION: 2.5 GY
RAD ONC ARIA PLAN PRIMARY REFERENCE POINT: NORMAL
RAD ONC ARIA PLAN TOTAL FRACTIONS PRESCRIBED: 28
RAD ONC ARIA PLAN TOTAL PRESCRIBED DOSE: 7000 CGY
RAD ONC ARIA REFERENCE POINT DOSAGE GIVEN TO DATE: 10 GY
RAD ONC ARIA REFERENCE POINT DOSAGE GIVEN TO DATE: 7.2 GY
RAD ONC ARIA REFERENCE POINT ID: NORMAL
RAD ONC ARIA REFERENCE POINT ID: NORMAL
RAD ONC ARIA REFERENCE POINT SESSION DOSAGE GIVEN: 1.8 GY
RAD ONC ARIA REFERENCE POINT SESSION DOSAGE GIVEN: 2.5 GY

## 2024-12-02 PROCEDURE — 77385: CPT | Performed by: RADIOLOGY

## 2024-12-03 ENCOUNTER — HOSPITAL ENCOUNTER (OUTPATIENT)
Facility: HOSPITAL | Age: 69
Discharge: HOME | End: 2024-12-03

## 2024-12-03 LAB
RAD ONC ARIA COURSE ID: 1
RAD ONC ARIA COURSE INTENT: NORMAL
RAD ONC ARIA COURSE LAST TREATMENT DATE: NORMAL
RAD ONC ARIA COURSE START DATE: NORMAL
RAD ONC ARIA COURSE TREATMENT ELAPSED DAYS: 8
RAD ONC ARIA FIRST TREATMENT DATE: NORMAL
RAD ONC ARIA PLAN FRACTIONS TREATED TO DATE: 5
RAD ONC ARIA PLAN ID: NORMAL
RAD ONC ARIA PLAN NAME: NORMAL
RAD ONC ARIA PLAN PRESCRIBED DOSE PER FRACTION: 2.5 GY
RAD ONC ARIA PLAN PRIMARY REFERENCE POINT: NORMAL
RAD ONC ARIA PLAN TOTAL FRACTIONS PRESCRIBED: 28
RAD ONC ARIA PLAN TOTAL PRESCRIBED DOSE: 7000 CGY
RAD ONC ARIA REFERENCE POINT DOSAGE GIVEN TO DATE: 12.5 GY
RAD ONC ARIA REFERENCE POINT DOSAGE GIVEN TO DATE: 9 GY
RAD ONC ARIA REFERENCE POINT ID: NORMAL
RAD ONC ARIA REFERENCE POINT ID: NORMAL
RAD ONC ARIA REFERENCE POINT SESSION DOSAGE GIVEN: 1.8 GY
RAD ONC ARIA REFERENCE POINT SESSION DOSAGE GIVEN: 2.5 GY

## 2024-12-03 PROCEDURE — 77385: CPT | Performed by: RADIOLOGY

## 2024-12-03 PROCEDURE — 77336 RADIATION PHYSICS CONSULT: CPT | Performed by: RADIOLOGY

## 2024-12-04 ENCOUNTER — HOSPITAL ENCOUNTER (OUTPATIENT)
Facility: HOSPITAL | Age: 69
Discharge: HOME | End: 2024-12-04

## 2024-12-04 LAB
RAD ONC ARIA COURSE ID: 1
RAD ONC ARIA COURSE INTENT: NORMAL
RAD ONC ARIA COURSE LAST TREATMENT DATE: NORMAL
RAD ONC ARIA COURSE START DATE: NORMAL
RAD ONC ARIA COURSE TREATMENT ELAPSED DAYS: 9
RAD ONC ARIA FIRST TREATMENT DATE: NORMAL
RAD ONC ARIA PLAN FRACTIONS TREATED TO DATE: 6
RAD ONC ARIA PLAN ID: NORMAL
RAD ONC ARIA PLAN NAME: NORMAL
RAD ONC ARIA PLAN PRESCRIBED DOSE PER FRACTION: 2.5 GY
RAD ONC ARIA PLAN PRIMARY REFERENCE POINT: NORMAL
RAD ONC ARIA PLAN TOTAL FRACTIONS PRESCRIBED: 28
RAD ONC ARIA PLAN TOTAL PRESCRIBED DOSE: 7000 CGY
RAD ONC ARIA REFERENCE POINT DOSAGE GIVEN TO DATE: 10.8 GY
RAD ONC ARIA REFERENCE POINT DOSAGE GIVEN TO DATE: 15 GY
RAD ONC ARIA REFERENCE POINT ID: NORMAL
RAD ONC ARIA REFERENCE POINT ID: NORMAL
RAD ONC ARIA REFERENCE POINT SESSION DOSAGE GIVEN: 1.8 GY
RAD ONC ARIA REFERENCE POINT SESSION DOSAGE GIVEN: 2.5 GY

## 2024-12-04 PROCEDURE — 77385: CPT | Performed by: RADIOLOGY

## 2024-12-05 ENCOUNTER — HOSPITAL ENCOUNTER (OUTPATIENT)
Facility: HOSPITAL | Age: 69
Discharge: HOME | End: 2024-12-05

## 2024-12-05 ENCOUNTER — DOCUMENTATION (OUTPATIENT)
Dept: NUTRITION | Facility: HOSPITAL | Age: 69
End: 2024-12-05
Payer: MEDICARE

## 2024-12-05 VITALS — BODY MASS INDEX: 32.19 KG/M2 | WEIGHT: 218 LBS

## 2024-12-05 LAB
RAD ONC ARIA COURSE ID: 1
RAD ONC ARIA COURSE INTENT: NORMAL
RAD ONC ARIA COURSE LAST TREATMENT DATE: NORMAL
RAD ONC ARIA COURSE START DATE: NORMAL
RAD ONC ARIA COURSE TREATMENT ELAPSED DAYS: 10
RAD ONC ARIA FIRST TREATMENT DATE: NORMAL
RAD ONC ARIA PLAN FRACTIONS TREATED TO DATE: 7
RAD ONC ARIA PLAN ID: NORMAL
RAD ONC ARIA PLAN NAME: NORMAL
RAD ONC ARIA PLAN PRESCRIBED DOSE PER FRACTION: 2.5 GY
RAD ONC ARIA PLAN PRIMARY REFERENCE POINT: NORMAL
RAD ONC ARIA PLAN TOTAL FRACTIONS PRESCRIBED: 28
RAD ONC ARIA PLAN TOTAL PRESCRIBED DOSE: 7000 CGY
RAD ONC ARIA REFERENCE POINT DOSAGE GIVEN TO DATE: 12.6 GY
RAD ONC ARIA REFERENCE POINT DOSAGE GIVEN TO DATE: 17.5 GY
RAD ONC ARIA REFERENCE POINT ID: NORMAL
RAD ONC ARIA REFERENCE POINT ID: NORMAL
RAD ONC ARIA REFERENCE POINT SESSION DOSAGE GIVEN: 1.8 GY
RAD ONC ARIA REFERENCE POINT SESSION DOSAGE GIVEN: 2.5 GY

## 2024-12-05 PROCEDURE — 77385: CPT | Performed by: RADIOLOGY

## 2024-12-05 NOTE — PROGRESS NOTES
ONC Nutrition    Diagnosis: Prostate Cancer     Radiation: 70 Hernandez delivered in 28 daily fractions     Weight:  8/30/24- 218#  11/5/24- 219#  11/27/24- 218#  12/5/24- 218#    Patient completed 7/28 treatments today.      Patient reports the past week has gone well. Patient reports he's doing better with the small, frequent meals, as well as, his fluid intake. Patient reports no new nutrition impact symptoms at this time.     All questions/concerns addressed at this time.     Will follow as indicated.     Ana Reynoso RD, LD

## 2024-12-06 ENCOUNTER — HOSPITAL ENCOUNTER (OUTPATIENT)
Facility: HOSPITAL | Age: 69
Discharge: HOME | End: 2024-12-06

## 2024-12-06 LAB
RAD ONC ARIA COURSE ID: 1
RAD ONC ARIA COURSE INTENT: NORMAL
RAD ONC ARIA COURSE LAST TREATMENT DATE: NORMAL
RAD ONC ARIA COURSE START DATE: NORMAL
RAD ONC ARIA COURSE TREATMENT ELAPSED DAYS: 11
RAD ONC ARIA FIRST TREATMENT DATE: NORMAL
RAD ONC ARIA PLAN FRACTIONS TREATED TO DATE: 8
RAD ONC ARIA PLAN ID: NORMAL
RAD ONC ARIA PLAN NAME: NORMAL
RAD ONC ARIA PLAN PRESCRIBED DOSE PER FRACTION: 2.5 GY
RAD ONC ARIA PLAN PRIMARY REFERENCE POINT: NORMAL
RAD ONC ARIA PLAN TOTAL FRACTIONS PRESCRIBED: 28
RAD ONC ARIA PLAN TOTAL PRESCRIBED DOSE: 7000 CGY
RAD ONC ARIA REFERENCE POINT DOSAGE GIVEN TO DATE: 14.4 GY
RAD ONC ARIA REFERENCE POINT DOSAGE GIVEN TO DATE: 20 GY
RAD ONC ARIA REFERENCE POINT ID: NORMAL
RAD ONC ARIA REFERENCE POINT ID: NORMAL
RAD ONC ARIA REFERENCE POINT SESSION DOSAGE GIVEN: 1.8 GY
RAD ONC ARIA REFERENCE POINT SESSION DOSAGE GIVEN: 2.5 GY

## 2024-12-06 PROCEDURE — 77385: CPT | Performed by: RADIOLOGY

## 2024-12-09 ENCOUNTER — HOSPITAL ENCOUNTER (OUTPATIENT)
Facility: HOSPITAL | Age: 69
Discharge: HOME | End: 2024-12-09
Payer: MEDICARE

## 2024-12-09 LAB
RAD ONC ARIA COURSE ID: 1
RAD ONC ARIA COURSE INTENT: NORMAL
RAD ONC ARIA COURSE LAST TREATMENT DATE: NORMAL
RAD ONC ARIA COURSE START DATE: NORMAL
RAD ONC ARIA COURSE TREATMENT ELAPSED DAYS: 14
RAD ONC ARIA FIRST TREATMENT DATE: NORMAL
RAD ONC ARIA PLAN FRACTIONS TREATED TO DATE: 9
RAD ONC ARIA PLAN ID: NORMAL
RAD ONC ARIA PLAN NAME: NORMAL
RAD ONC ARIA PLAN PRESCRIBED DOSE PER FRACTION: 2.5 GY
RAD ONC ARIA PLAN PRIMARY REFERENCE POINT: NORMAL
RAD ONC ARIA PLAN TOTAL FRACTIONS PRESCRIBED: 28
RAD ONC ARIA PLAN TOTAL PRESCRIBED DOSE: 7000 CGY
RAD ONC ARIA REFERENCE POINT DOSAGE GIVEN TO DATE: 16.2 GY
RAD ONC ARIA REFERENCE POINT DOSAGE GIVEN TO DATE: 22.5 GY
RAD ONC ARIA REFERENCE POINT ID: NORMAL
RAD ONC ARIA REFERENCE POINT ID: NORMAL
RAD ONC ARIA REFERENCE POINT SESSION DOSAGE GIVEN: 1.8 GY
RAD ONC ARIA REFERENCE POINT SESSION DOSAGE GIVEN: 2.5 GY

## 2024-12-09 PROCEDURE — 77385: CPT | Performed by: RADIOLOGY

## 2024-12-10 ENCOUNTER — HOSPITAL ENCOUNTER (OUTPATIENT)
Facility: HOSPITAL | Age: 69
Discharge: HOME | End: 2024-12-10

## 2024-12-10 LAB
RAD ONC ARIA COURSE ID: 1
RAD ONC ARIA COURSE INTENT: NORMAL
RAD ONC ARIA COURSE LAST TREATMENT DATE: NORMAL
RAD ONC ARIA COURSE START DATE: NORMAL
RAD ONC ARIA COURSE TREATMENT ELAPSED DAYS: 15
RAD ONC ARIA FIRST TREATMENT DATE: NORMAL
RAD ONC ARIA PLAN FRACTIONS TREATED TO DATE: 10
RAD ONC ARIA PLAN ID: NORMAL
RAD ONC ARIA PLAN NAME: NORMAL
RAD ONC ARIA PLAN PRESCRIBED DOSE PER FRACTION: 2.5 GY
RAD ONC ARIA PLAN PRIMARY REFERENCE POINT: NORMAL
RAD ONC ARIA PLAN TOTAL FRACTIONS PRESCRIBED: 28
RAD ONC ARIA PLAN TOTAL PRESCRIBED DOSE: 7000 CGY
RAD ONC ARIA REFERENCE POINT DOSAGE GIVEN TO DATE: 18 GY
RAD ONC ARIA REFERENCE POINT DOSAGE GIVEN TO DATE: 25 GY
RAD ONC ARIA REFERENCE POINT ID: NORMAL
RAD ONC ARIA REFERENCE POINT ID: NORMAL
RAD ONC ARIA REFERENCE POINT SESSION DOSAGE GIVEN: 1.8 GY
RAD ONC ARIA REFERENCE POINT SESSION DOSAGE GIVEN: 2.5 GY

## 2024-12-10 PROCEDURE — 77385: CPT | Performed by: RADIOLOGY

## 2024-12-10 PROCEDURE — 77336 RADIATION PHYSICS CONSULT: CPT | Performed by: RADIOLOGY

## 2024-12-11 ENCOUNTER — HOSPITAL ENCOUNTER (OUTPATIENT)
Facility: HOSPITAL | Age: 69
Discharge: HOME | End: 2024-12-11

## 2024-12-11 LAB
RAD ONC ARIA COURSE ID: 1
RAD ONC ARIA COURSE INTENT: NORMAL
RAD ONC ARIA COURSE LAST TREATMENT DATE: NORMAL
RAD ONC ARIA COURSE START DATE: NORMAL
RAD ONC ARIA COURSE TREATMENT ELAPSED DAYS: 16
RAD ONC ARIA FIRST TREATMENT DATE: NORMAL
RAD ONC ARIA PLAN FRACTIONS TREATED TO DATE: 11
RAD ONC ARIA PLAN ID: NORMAL
RAD ONC ARIA PLAN NAME: NORMAL
RAD ONC ARIA PLAN PRESCRIBED DOSE PER FRACTION: 2.5 GY
RAD ONC ARIA PLAN PRIMARY REFERENCE POINT: NORMAL
RAD ONC ARIA PLAN TOTAL FRACTIONS PRESCRIBED: 28
RAD ONC ARIA PLAN TOTAL PRESCRIBED DOSE: 7000 CGY
RAD ONC ARIA REFERENCE POINT DOSAGE GIVEN TO DATE: 19.8 GY
RAD ONC ARIA REFERENCE POINT DOSAGE GIVEN TO DATE: 27.5 GY
RAD ONC ARIA REFERENCE POINT ID: NORMAL
RAD ONC ARIA REFERENCE POINT ID: NORMAL
RAD ONC ARIA REFERENCE POINT SESSION DOSAGE GIVEN: 1.8 GY
RAD ONC ARIA REFERENCE POINT SESSION DOSAGE GIVEN: 2.5 GY

## 2024-12-11 PROCEDURE — 77385: CPT | Performed by: RADIOLOGY

## 2024-12-12 ENCOUNTER — HOSPITAL ENCOUNTER (OUTPATIENT)
Facility: HOSPITAL | Age: 69
Discharge: HOME | End: 2024-12-12

## 2024-12-12 ENCOUNTER — DOCUMENTATION (OUTPATIENT)
Dept: NUTRITION | Facility: HOSPITAL | Age: 69
End: 2024-12-12
Payer: MEDICARE

## 2024-12-12 VITALS — BODY MASS INDEX: 32.19 KG/M2 | WEIGHT: 218 LBS

## 2024-12-12 LAB
RAD ONC ARIA COURSE ID: 1
RAD ONC ARIA COURSE INTENT: NORMAL
RAD ONC ARIA COURSE LAST TREATMENT DATE: NORMAL
RAD ONC ARIA COURSE START DATE: NORMAL
RAD ONC ARIA COURSE TREATMENT ELAPSED DAYS: 17
RAD ONC ARIA FIRST TREATMENT DATE: NORMAL
RAD ONC ARIA PLAN FRACTIONS TREATED TO DATE: 12
RAD ONC ARIA PLAN ID: NORMAL
RAD ONC ARIA PLAN NAME: NORMAL
RAD ONC ARIA PLAN PRESCRIBED DOSE PER FRACTION: 2.5 GY
RAD ONC ARIA PLAN PRIMARY REFERENCE POINT: NORMAL
RAD ONC ARIA PLAN TOTAL FRACTIONS PRESCRIBED: 28
RAD ONC ARIA PLAN TOTAL PRESCRIBED DOSE: 7000 CGY
RAD ONC ARIA REFERENCE POINT DOSAGE GIVEN TO DATE: 21.6 GY
RAD ONC ARIA REFERENCE POINT DOSAGE GIVEN TO DATE: 30 GY
RAD ONC ARIA REFERENCE POINT ID: NORMAL
RAD ONC ARIA REFERENCE POINT ID: NORMAL
RAD ONC ARIA REFERENCE POINT SESSION DOSAGE GIVEN: 1.8 GY
RAD ONC ARIA REFERENCE POINT SESSION DOSAGE GIVEN: 2.5 GY

## 2024-12-12 PROCEDURE — 77385: CPT | Performed by: RADIOLOGY

## 2024-12-12 NOTE — PROGRESS NOTES
ONC Nutrition     Diagnosis: Prostate Cancer     Radiation: 70 Hernandez delivered in 28 daily fractions      Weight:  8/30/24- 218#  11/5/24- 219#  11/27/24- 218#  12/5/24- 218#  12/12/24- 218#     Patient completed 12/28 treatments today.       Patient reports the past week has gone well. Patient reports he's doing better with the small, frequent meals, as well as, his fluid intake. Patient does report some diarrhea but doesn't know if it's because he's been a little under the weather. Patient is supposed to let RD know if he continues to have diarrhea. Patient did buy some propel packets to try as well.      All questions/concerns addressed at this time.      Will follow as indicated.      Ana Reynoso, MERY, LD

## 2024-12-13 ENCOUNTER — HOSPITAL ENCOUNTER (OUTPATIENT)
Facility: HOSPITAL | Age: 69
Discharge: HOME | End: 2024-12-13

## 2024-12-13 LAB
RAD ONC ARIA COURSE ID: 1
RAD ONC ARIA COURSE INTENT: NORMAL
RAD ONC ARIA COURSE LAST TREATMENT DATE: NORMAL
RAD ONC ARIA COURSE START DATE: NORMAL
RAD ONC ARIA COURSE TREATMENT ELAPSED DAYS: 18
RAD ONC ARIA FIRST TREATMENT DATE: NORMAL
RAD ONC ARIA PLAN FRACTIONS TREATED TO DATE: 13
RAD ONC ARIA PLAN ID: NORMAL
RAD ONC ARIA PLAN NAME: NORMAL
RAD ONC ARIA PLAN PRESCRIBED DOSE PER FRACTION: 2.5 GY
RAD ONC ARIA PLAN PRIMARY REFERENCE POINT: NORMAL
RAD ONC ARIA PLAN TOTAL FRACTIONS PRESCRIBED: 28
RAD ONC ARIA PLAN TOTAL PRESCRIBED DOSE: 7000 CGY
RAD ONC ARIA REFERENCE POINT DOSAGE GIVEN TO DATE: 23.4 GY
RAD ONC ARIA REFERENCE POINT DOSAGE GIVEN TO DATE: 32.5 GY
RAD ONC ARIA REFERENCE POINT ID: NORMAL
RAD ONC ARIA REFERENCE POINT ID: NORMAL
RAD ONC ARIA REFERENCE POINT SESSION DOSAGE GIVEN: 1.8 GY
RAD ONC ARIA REFERENCE POINT SESSION DOSAGE GIVEN: 2.5 GY

## 2024-12-13 PROCEDURE — 77385: CPT | Performed by: RADIOLOGY

## 2024-12-16 ENCOUNTER — HOSPITAL ENCOUNTER (OUTPATIENT)
Facility: HOSPITAL | Age: 69
Discharge: HOME | End: 2024-12-16
Payer: MEDICARE

## 2024-12-16 LAB
RAD ONC ARIA COURSE ID: 1
RAD ONC ARIA COURSE INTENT: NORMAL
RAD ONC ARIA COURSE LAST TREATMENT DATE: NORMAL
RAD ONC ARIA COURSE START DATE: NORMAL
RAD ONC ARIA COURSE TREATMENT ELAPSED DAYS: 21
RAD ONC ARIA FIRST TREATMENT DATE: NORMAL
RAD ONC ARIA PLAN FRACTIONS TREATED TO DATE: 14
RAD ONC ARIA PLAN ID: NORMAL
RAD ONC ARIA PLAN NAME: NORMAL
RAD ONC ARIA PLAN PRESCRIBED DOSE PER FRACTION: 2.5 GY
RAD ONC ARIA PLAN PRIMARY REFERENCE POINT: NORMAL
RAD ONC ARIA PLAN TOTAL FRACTIONS PRESCRIBED: 28
RAD ONC ARIA PLAN TOTAL PRESCRIBED DOSE: 7000 CGY
RAD ONC ARIA REFERENCE POINT DOSAGE GIVEN TO DATE: 25.2 GY
RAD ONC ARIA REFERENCE POINT DOSAGE GIVEN TO DATE: 35 GY
RAD ONC ARIA REFERENCE POINT ID: NORMAL
RAD ONC ARIA REFERENCE POINT ID: NORMAL
RAD ONC ARIA REFERENCE POINT SESSION DOSAGE GIVEN: 1.8 GY
RAD ONC ARIA REFERENCE POINT SESSION DOSAGE GIVEN: 2.5 GY

## 2024-12-16 PROCEDURE — 77385: CPT | Performed by: RADIOLOGY

## 2024-12-17 ENCOUNTER — HOSPITAL ENCOUNTER (OUTPATIENT)
Facility: HOSPITAL | Age: 69
Discharge: HOME | End: 2024-12-17

## 2024-12-17 LAB
RAD ONC ARIA COURSE ID: 1
RAD ONC ARIA COURSE INTENT: NORMAL
RAD ONC ARIA COURSE LAST TREATMENT DATE: NORMAL
RAD ONC ARIA COURSE START DATE: NORMAL
RAD ONC ARIA COURSE TREATMENT ELAPSED DAYS: 22
RAD ONC ARIA FIRST TREATMENT DATE: NORMAL
RAD ONC ARIA PLAN FRACTIONS TREATED TO DATE: 15
RAD ONC ARIA PLAN ID: NORMAL
RAD ONC ARIA PLAN NAME: NORMAL
RAD ONC ARIA PLAN PRESCRIBED DOSE PER FRACTION: 2.5 GY
RAD ONC ARIA PLAN PRIMARY REFERENCE POINT: NORMAL
RAD ONC ARIA PLAN TOTAL FRACTIONS PRESCRIBED: 28
RAD ONC ARIA PLAN TOTAL PRESCRIBED DOSE: 7000 CGY
RAD ONC ARIA REFERENCE POINT DOSAGE GIVEN TO DATE: 27 GY
RAD ONC ARIA REFERENCE POINT DOSAGE GIVEN TO DATE: 37.5 GY
RAD ONC ARIA REFERENCE POINT ID: NORMAL
RAD ONC ARIA REFERENCE POINT ID: NORMAL
RAD ONC ARIA REFERENCE POINT SESSION DOSAGE GIVEN: 1.8 GY
RAD ONC ARIA REFERENCE POINT SESSION DOSAGE GIVEN: 2.5 GY

## 2024-12-17 PROCEDURE — 77336 RADIATION PHYSICS CONSULT: CPT | Performed by: RADIOLOGY

## 2024-12-17 PROCEDURE — 77385: CPT | Performed by: RADIOLOGY

## 2024-12-18 ENCOUNTER — HOSPITAL ENCOUNTER (OUTPATIENT)
Facility: HOSPITAL | Age: 69
Discharge: HOME | End: 2024-12-18

## 2024-12-18 LAB
RAD ONC ARIA COURSE ID: 1
RAD ONC ARIA COURSE INTENT: NORMAL
RAD ONC ARIA COURSE LAST TREATMENT DATE: NORMAL
RAD ONC ARIA COURSE START DATE: NORMAL
RAD ONC ARIA COURSE TREATMENT ELAPSED DAYS: 23
RAD ONC ARIA FIRST TREATMENT DATE: NORMAL
RAD ONC ARIA PLAN FRACTIONS TREATED TO DATE: 16
RAD ONC ARIA PLAN ID: NORMAL
RAD ONC ARIA PLAN NAME: NORMAL
RAD ONC ARIA PLAN PRESCRIBED DOSE PER FRACTION: 2.5 GY
RAD ONC ARIA PLAN PRIMARY REFERENCE POINT: NORMAL
RAD ONC ARIA PLAN TOTAL FRACTIONS PRESCRIBED: 28
RAD ONC ARIA PLAN TOTAL PRESCRIBED DOSE: 7000 CGY
RAD ONC ARIA REFERENCE POINT DOSAGE GIVEN TO DATE: 28.8 GY
RAD ONC ARIA REFERENCE POINT DOSAGE GIVEN TO DATE: 40 GY
RAD ONC ARIA REFERENCE POINT ID: NORMAL
RAD ONC ARIA REFERENCE POINT ID: NORMAL
RAD ONC ARIA REFERENCE POINT SESSION DOSAGE GIVEN: 1.8 GY
RAD ONC ARIA REFERENCE POINT SESSION DOSAGE GIVEN: 2.5 GY

## 2024-12-18 PROCEDURE — 77385: CPT | Performed by: RADIOLOGY

## 2024-12-19 ENCOUNTER — HOSPITAL ENCOUNTER (OUTPATIENT)
Facility: HOSPITAL | Age: 69
Discharge: HOME | End: 2024-12-19

## 2024-12-19 LAB
RAD ONC ARIA COURSE ID: 1
RAD ONC ARIA COURSE INTENT: NORMAL
RAD ONC ARIA COURSE LAST TREATMENT DATE: NORMAL
RAD ONC ARIA COURSE START DATE: NORMAL
RAD ONC ARIA COURSE TREATMENT ELAPSED DAYS: 24
RAD ONC ARIA FIRST TREATMENT DATE: NORMAL
RAD ONC ARIA PLAN FRACTIONS TREATED TO DATE: 17
RAD ONC ARIA PLAN ID: NORMAL
RAD ONC ARIA PLAN NAME: NORMAL
RAD ONC ARIA PLAN PRESCRIBED DOSE PER FRACTION: 2.5 GY
RAD ONC ARIA PLAN PRIMARY REFERENCE POINT: NORMAL
RAD ONC ARIA PLAN TOTAL FRACTIONS PRESCRIBED: 28
RAD ONC ARIA PLAN TOTAL PRESCRIBED DOSE: 7000 CGY
RAD ONC ARIA REFERENCE POINT DOSAGE GIVEN TO DATE: 30.6 GY
RAD ONC ARIA REFERENCE POINT DOSAGE GIVEN TO DATE: 42.5 GY
RAD ONC ARIA REFERENCE POINT ID: NORMAL
RAD ONC ARIA REFERENCE POINT ID: NORMAL
RAD ONC ARIA REFERENCE POINT SESSION DOSAGE GIVEN: 1.8 GY
RAD ONC ARIA REFERENCE POINT SESSION DOSAGE GIVEN: 2.5 GY

## 2024-12-19 PROCEDURE — 77385: CPT | Performed by: RADIOLOGY

## 2024-12-19 RX ORDER — ALFUZOSIN HYDROCHLORIDE 10 MG/1
10 TABLET, EXTENDED RELEASE ORAL DAILY
Qty: 90 TABLET | Refills: 3 | Status: SHIPPED | OUTPATIENT
Start: 2024-12-19

## 2024-12-20 ENCOUNTER — HOSPITAL ENCOUNTER (OUTPATIENT)
Facility: HOSPITAL | Age: 69
Discharge: HOME | End: 2024-12-20

## 2024-12-20 LAB
RAD ONC ARIA COURSE ID: 1
RAD ONC ARIA COURSE INTENT: NORMAL
RAD ONC ARIA COURSE LAST TREATMENT DATE: NORMAL
RAD ONC ARIA COURSE START DATE: NORMAL
RAD ONC ARIA COURSE TREATMENT ELAPSED DAYS: 25
RAD ONC ARIA FIRST TREATMENT DATE: NORMAL
RAD ONC ARIA PLAN FRACTIONS TREATED TO DATE: 18
RAD ONC ARIA PLAN ID: NORMAL
RAD ONC ARIA PLAN NAME: NORMAL
RAD ONC ARIA PLAN PRESCRIBED DOSE PER FRACTION: 2.5 GY
RAD ONC ARIA PLAN PRIMARY REFERENCE POINT: NORMAL
RAD ONC ARIA PLAN TOTAL FRACTIONS PRESCRIBED: 28
RAD ONC ARIA PLAN TOTAL PRESCRIBED DOSE: 7000 CGY
RAD ONC ARIA REFERENCE POINT DOSAGE GIVEN TO DATE: 32.4 GY
RAD ONC ARIA REFERENCE POINT DOSAGE GIVEN TO DATE: 45 GY
RAD ONC ARIA REFERENCE POINT ID: NORMAL
RAD ONC ARIA REFERENCE POINT ID: NORMAL
RAD ONC ARIA REFERENCE POINT SESSION DOSAGE GIVEN: 1.8 GY
RAD ONC ARIA REFERENCE POINT SESSION DOSAGE GIVEN: 2.5 GY

## 2024-12-20 PROCEDURE — 77385: CPT | Performed by: RADIOLOGY

## 2024-12-23 ENCOUNTER — HOSPITAL ENCOUNTER (OUTPATIENT)
Facility: HOSPITAL | Age: 69
Discharge: HOME | End: 2024-12-23
Payer: MEDICARE

## 2024-12-23 LAB
RAD ONC ARIA COURSE ID: 1
RAD ONC ARIA COURSE INTENT: NORMAL
RAD ONC ARIA COURSE LAST TREATMENT DATE: NORMAL
RAD ONC ARIA COURSE START DATE: NORMAL
RAD ONC ARIA COURSE TREATMENT ELAPSED DAYS: 28
RAD ONC ARIA FIRST TREATMENT DATE: NORMAL
RAD ONC ARIA PLAN FRACTIONS TREATED TO DATE: 19
RAD ONC ARIA PLAN ID: NORMAL
RAD ONC ARIA PLAN NAME: NORMAL
RAD ONC ARIA PLAN PRESCRIBED DOSE PER FRACTION: 2.5 GY
RAD ONC ARIA PLAN PRIMARY REFERENCE POINT: NORMAL
RAD ONC ARIA PLAN TOTAL FRACTIONS PRESCRIBED: 28
RAD ONC ARIA PLAN TOTAL PRESCRIBED DOSE: 7000 CGY
RAD ONC ARIA REFERENCE POINT DOSAGE GIVEN TO DATE: 34.2 GY
RAD ONC ARIA REFERENCE POINT DOSAGE GIVEN TO DATE: 47.5 GY
RAD ONC ARIA REFERENCE POINT ID: NORMAL
RAD ONC ARIA REFERENCE POINT ID: NORMAL
RAD ONC ARIA REFERENCE POINT SESSION DOSAGE GIVEN: 1.8 GY
RAD ONC ARIA REFERENCE POINT SESSION DOSAGE GIVEN: 2.5 GY

## 2024-12-23 PROCEDURE — 77385: CPT | Performed by: RADIOLOGY

## 2024-12-24 ENCOUNTER — HOSPITAL ENCOUNTER (OUTPATIENT)
Facility: HOSPITAL | Age: 69
Discharge: HOME | End: 2024-12-24

## 2024-12-24 LAB
RAD ONC ARIA COURSE ID: 1
RAD ONC ARIA COURSE INTENT: NORMAL
RAD ONC ARIA COURSE LAST TREATMENT DATE: NORMAL
RAD ONC ARIA COURSE START DATE: NORMAL
RAD ONC ARIA COURSE TREATMENT ELAPSED DAYS: 29
RAD ONC ARIA FIRST TREATMENT DATE: NORMAL
RAD ONC ARIA PLAN FRACTIONS TREATED TO DATE: 20
RAD ONC ARIA PLAN ID: NORMAL
RAD ONC ARIA PLAN NAME: NORMAL
RAD ONC ARIA PLAN PRESCRIBED DOSE PER FRACTION: 2.5 GY
RAD ONC ARIA PLAN PRIMARY REFERENCE POINT: NORMAL
RAD ONC ARIA PLAN TOTAL FRACTIONS PRESCRIBED: 28
RAD ONC ARIA PLAN TOTAL PRESCRIBED DOSE: 7000 CGY
RAD ONC ARIA REFERENCE POINT DOSAGE GIVEN TO DATE: 36 GY
RAD ONC ARIA REFERENCE POINT DOSAGE GIVEN TO DATE: 50 GY
RAD ONC ARIA REFERENCE POINT ID: NORMAL
RAD ONC ARIA REFERENCE POINT ID: NORMAL
RAD ONC ARIA REFERENCE POINT SESSION DOSAGE GIVEN: 1.8 GY
RAD ONC ARIA REFERENCE POINT SESSION DOSAGE GIVEN: 2.5 GY

## 2024-12-24 PROCEDURE — 77385: CPT | Performed by: RADIOLOGY

## 2024-12-24 PROCEDURE — 77336 RADIATION PHYSICS CONSULT: CPT | Performed by: RADIOLOGY

## 2024-12-26 ENCOUNTER — HOSPITAL ENCOUNTER (OUTPATIENT)
Facility: HOSPITAL | Age: 69
Discharge: HOME | End: 2024-12-26

## 2024-12-26 ENCOUNTER — DOCUMENTATION (OUTPATIENT)
Dept: NUTRITION | Facility: HOSPITAL | Age: 69
End: 2024-12-26
Payer: MEDICARE

## 2024-12-26 VITALS — BODY MASS INDEX: 32.19 KG/M2 | WEIGHT: 218 LBS

## 2024-12-26 LAB
RAD ONC ARIA COURSE ID: 1
RAD ONC ARIA COURSE INTENT: NORMAL
RAD ONC ARIA COURSE LAST TREATMENT DATE: NORMAL
RAD ONC ARIA COURSE START DATE: NORMAL
RAD ONC ARIA COURSE TREATMENT ELAPSED DAYS: 31
RAD ONC ARIA FIRST TREATMENT DATE: NORMAL
RAD ONC ARIA PLAN FRACTIONS TREATED TO DATE: 21
RAD ONC ARIA PLAN ID: NORMAL
RAD ONC ARIA PLAN NAME: NORMAL
RAD ONC ARIA PLAN PRESCRIBED DOSE PER FRACTION: 2.5 GY
RAD ONC ARIA PLAN PRIMARY REFERENCE POINT: NORMAL
RAD ONC ARIA PLAN TOTAL FRACTIONS PRESCRIBED: 28
RAD ONC ARIA PLAN TOTAL PRESCRIBED DOSE: 7000 CGY
RAD ONC ARIA REFERENCE POINT DOSAGE GIVEN TO DATE: 37.8 GY
RAD ONC ARIA REFERENCE POINT DOSAGE GIVEN TO DATE: 52.5 GY
RAD ONC ARIA REFERENCE POINT ID: NORMAL
RAD ONC ARIA REFERENCE POINT ID: NORMAL
RAD ONC ARIA REFERENCE POINT SESSION DOSAGE GIVEN: 1.8 GY
RAD ONC ARIA REFERENCE POINT SESSION DOSAGE GIVEN: 2.5 GY

## 2024-12-26 PROCEDURE — 77385: CPT | Performed by: RADIOLOGY

## 2024-12-26 RX ORDER — ATORVASTATIN CALCIUM 10 MG/1
1 TABLET, FILM COATED ORAL DAILY
COMMUNITY
Start: 2024-12-23

## 2024-12-26 NOTE — PROGRESS NOTES
ONC Nutrition     Diagnosis: Prostate Cancer     Radiation: 70 Hernandez delivered in 28 daily fractions      Weight:  8/30/24- 218#  11/5/24- 219#  11/27/24- 218#  12/5/24- 218#  12/12/24- 218#  12/26/24- 218#     Patient completed 21/28 treatments today.       Patient reports the past week has gone well. Patient reports his bowel movements are normal as well. Patient mentions high cholesterol. RD suggested increasing his fiber intake throughout the day, as well as, increasing fish/seafood intake for omega-3's.      All questions/concerns addressed at this time.      Will follow as indicated.      Ana Reynoso, MERY, LD

## 2024-12-27 ENCOUNTER — HOSPITAL ENCOUNTER (OUTPATIENT)
Facility: HOSPITAL | Age: 69
Discharge: HOME | End: 2024-12-27

## 2024-12-27 LAB
RAD ONC ARIA COURSE ID: 1
RAD ONC ARIA COURSE INTENT: NORMAL
RAD ONC ARIA COURSE LAST TREATMENT DATE: NORMAL
RAD ONC ARIA COURSE START DATE: NORMAL
RAD ONC ARIA COURSE TREATMENT ELAPSED DAYS: 32
RAD ONC ARIA FIRST TREATMENT DATE: NORMAL
RAD ONC ARIA PLAN FRACTIONS TREATED TO DATE: 22
RAD ONC ARIA PLAN ID: NORMAL
RAD ONC ARIA PLAN NAME: NORMAL
RAD ONC ARIA PLAN PRESCRIBED DOSE PER FRACTION: 2.5 GY
RAD ONC ARIA PLAN PRIMARY REFERENCE POINT: NORMAL
RAD ONC ARIA PLAN TOTAL FRACTIONS PRESCRIBED: 28
RAD ONC ARIA PLAN TOTAL PRESCRIBED DOSE: 7000 CGY
RAD ONC ARIA REFERENCE POINT DOSAGE GIVEN TO DATE: 39.6 GY
RAD ONC ARIA REFERENCE POINT DOSAGE GIVEN TO DATE: 55 GY
RAD ONC ARIA REFERENCE POINT ID: NORMAL
RAD ONC ARIA REFERENCE POINT ID: NORMAL
RAD ONC ARIA REFERENCE POINT SESSION DOSAGE GIVEN: 1.8 GY
RAD ONC ARIA REFERENCE POINT SESSION DOSAGE GIVEN: 2.5 GY

## 2024-12-27 PROCEDURE — 77385: CPT | Performed by: RADIOLOGY

## 2024-12-30 ENCOUNTER — HOSPITAL ENCOUNTER (OUTPATIENT)
Facility: HOSPITAL | Age: 69
Discharge: HOME | End: 2024-12-30
Payer: MEDICARE

## 2024-12-30 LAB
RAD ONC ARIA COURSE ID: 1
RAD ONC ARIA COURSE INTENT: NORMAL
RAD ONC ARIA COURSE LAST TREATMENT DATE: NORMAL
RAD ONC ARIA COURSE START DATE: NORMAL
RAD ONC ARIA COURSE TREATMENT ELAPSED DAYS: 35
RAD ONC ARIA FIRST TREATMENT DATE: NORMAL
RAD ONC ARIA PLAN FRACTIONS TREATED TO DATE: 23
RAD ONC ARIA PLAN ID: NORMAL
RAD ONC ARIA PLAN NAME: NORMAL
RAD ONC ARIA PLAN PRESCRIBED DOSE PER FRACTION: 2.5 GY
RAD ONC ARIA PLAN PRIMARY REFERENCE POINT: NORMAL
RAD ONC ARIA PLAN TOTAL FRACTIONS PRESCRIBED: 28
RAD ONC ARIA PLAN TOTAL PRESCRIBED DOSE: 7000 CGY
RAD ONC ARIA REFERENCE POINT DOSAGE GIVEN TO DATE: 41.4 GY
RAD ONC ARIA REFERENCE POINT DOSAGE GIVEN TO DATE: 57.5 GY
RAD ONC ARIA REFERENCE POINT ID: NORMAL
RAD ONC ARIA REFERENCE POINT ID: NORMAL
RAD ONC ARIA REFERENCE POINT SESSION DOSAGE GIVEN: 1.8 GY
RAD ONC ARIA REFERENCE POINT SESSION DOSAGE GIVEN: 2.5 GY

## 2024-12-30 PROCEDURE — 77385: CPT | Performed by: RADIOLOGY

## 2024-12-31 ENCOUNTER — HOSPITAL ENCOUNTER (OUTPATIENT)
Facility: HOSPITAL | Age: 69
Discharge: HOME | End: 2024-12-31

## 2024-12-31 LAB
RAD ONC ARIA COURSE ID: 1
RAD ONC ARIA COURSE INTENT: NORMAL
RAD ONC ARIA COURSE LAST TREATMENT DATE: NORMAL
RAD ONC ARIA COURSE START DATE: NORMAL
RAD ONC ARIA COURSE TREATMENT ELAPSED DAYS: 36
RAD ONC ARIA FIRST TREATMENT DATE: NORMAL
RAD ONC ARIA PLAN FRACTIONS TREATED TO DATE: 24
RAD ONC ARIA PLAN ID: NORMAL
RAD ONC ARIA PLAN NAME: NORMAL
RAD ONC ARIA PLAN PRESCRIBED DOSE PER FRACTION: 2.5 GY
RAD ONC ARIA PLAN PRIMARY REFERENCE POINT: NORMAL
RAD ONC ARIA PLAN TOTAL FRACTIONS PRESCRIBED: 28
RAD ONC ARIA PLAN TOTAL PRESCRIBED DOSE: 7000 CGY
RAD ONC ARIA REFERENCE POINT DOSAGE GIVEN TO DATE: 43.2 GY
RAD ONC ARIA REFERENCE POINT DOSAGE GIVEN TO DATE: 60 GY
RAD ONC ARIA REFERENCE POINT ID: NORMAL
RAD ONC ARIA REFERENCE POINT ID: NORMAL
RAD ONC ARIA REFERENCE POINT SESSION DOSAGE GIVEN: 1.8 GY
RAD ONC ARIA REFERENCE POINT SESSION DOSAGE GIVEN: 2.5 GY

## 2024-12-31 PROCEDURE — 77385: CPT | Performed by: RADIOLOGY

## 2025-01-02 ENCOUNTER — DOCUMENTATION (OUTPATIENT)
Dept: NUTRITION | Facility: HOSPITAL | Age: 70
End: 2025-01-02
Payer: MEDICARE

## 2025-01-02 ENCOUNTER — HOSPITAL ENCOUNTER (OUTPATIENT)
Facility: HOSPITAL | Age: 70
Discharge: HOME | End: 2025-01-02

## 2025-01-02 ENCOUNTER — HOSPITAL ENCOUNTER (OUTPATIENT)
Facility: HOSPITAL | Age: 70
Setting detail: RADIATION/ONCOLOGY SERIES
End: 2025-01-02
Payer: MEDICARE

## 2025-01-02 VITALS — BODY MASS INDEX: 32.34 KG/M2 | WEIGHT: 219 LBS

## 2025-01-02 LAB
RAD ONC ARIA COURSE ID: 1
RAD ONC ARIA COURSE INTENT: NORMAL
RAD ONC ARIA COURSE LAST TREATMENT DATE: NORMAL
RAD ONC ARIA COURSE START DATE: NORMAL
RAD ONC ARIA COURSE TREATMENT ELAPSED DAYS: 38
RAD ONC ARIA FIRST TREATMENT DATE: NORMAL
RAD ONC ARIA PLAN FRACTIONS TREATED TO DATE: 25
RAD ONC ARIA PLAN ID: NORMAL
RAD ONC ARIA PLAN NAME: NORMAL
RAD ONC ARIA PLAN PRESCRIBED DOSE PER FRACTION: 2.5 GY
RAD ONC ARIA PLAN PRIMARY REFERENCE POINT: NORMAL
RAD ONC ARIA PLAN TOTAL FRACTIONS PRESCRIBED: 28
RAD ONC ARIA PLAN TOTAL PRESCRIBED DOSE: 7000 CGY
RAD ONC ARIA REFERENCE POINT DOSAGE GIVEN TO DATE: 45 GY
RAD ONC ARIA REFERENCE POINT DOSAGE GIVEN TO DATE: 62.5 GY
RAD ONC ARIA REFERENCE POINT ID: NORMAL
RAD ONC ARIA REFERENCE POINT ID: NORMAL
RAD ONC ARIA REFERENCE POINT SESSION DOSAGE GIVEN: 1.8 GY
RAD ONC ARIA REFERENCE POINT SESSION DOSAGE GIVEN: 2.5 GY

## 2025-01-02 PROCEDURE — 77336 RADIATION PHYSICS CONSULT: CPT | Performed by: RADIOLOGY

## 2025-01-02 PROCEDURE — 77385: CPT | Performed by: RADIOLOGY

## 2025-01-02 NOTE — PROGRESS NOTES
ONC Nutrition     Diagnosis: Prostate Cancer     Radiation: 70 Hernandez delivered in 28 daily fractions      Weight:  8/30/24- 218#  11/5/24- 219#  11/27/24- 218#  12/5/24- 218#  12/12/24- 218#  12/26/24- 218#  1/2/25- 219#     Patient completed 25/28 treatments today.       Patient reports the past week has gone well. Patient reports no nutrition impact symptoms at this time.      All questions/concerns addressed at this time.      Will follow as indicated.      Ana Reynoso RD, LD

## 2025-01-03 ENCOUNTER — HOSPITAL ENCOUNTER (OUTPATIENT)
Facility: HOSPITAL | Age: 70
Discharge: HOME | End: 2025-01-03

## 2025-01-03 LAB
RAD ONC ARIA COURSE ID: 1
RAD ONC ARIA COURSE INTENT: NORMAL
RAD ONC ARIA COURSE LAST TREATMENT DATE: NORMAL
RAD ONC ARIA COURSE START DATE: NORMAL
RAD ONC ARIA COURSE TREATMENT ELAPSED DAYS: 39
RAD ONC ARIA FIRST TREATMENT DATE: NORMAL
RAD ONC ARIA PLAN FRACTIONS TREATED TO DATE: 26
RAD ONC ARIA PLAN ID: NORMAL
RAD ONC ARIA PLAN NAME: NORMAL
RAD ONC ARIA PLAN PRESCRIBED DOSE PER FRACTION: 2.5 GY
RAD ONC ARIA PLAN PRIMARY REFERENCE POINT: NORMAL
RAD ONC ARIA PLAN TOTAL FRACTIONS PRESCRIBED: 28
RAD ONC ARIA PLAN TOTAL PRESCRIBED DOSE: 7000 CGY
RAD ONC ARIA REFERENCE POINT DOSAGE GIVEN TO DATE: 46.8 GY
RAD ONC ARIA REFERENCE POINT DOSAGE GIVEN TO DATE: 65 GY
RAD ONC ARIA REFERENCE POINT ID: NORMAL
RAD ONC ARIA REFERENCE POINT ID: NORMAL
RAD ONC ARIA REFERENCE POINT SESSION DOSAGE GIVEN: 1.8 GY
RAD ONC ARIA REFERENCE POINT SESSION DOSAGE GIVEN: 2.5 GY

## 2025-01-03 PROCEDURE — 77385: CPT | Performed by: RADIOLOGY

## 2025-01-09 ENCOUNTER — TELEPHONE (OUTPATIENT)
Age: 70
End: 2025-01-09
Payer: MEDICARE

## 2025-01-09 NOTE — TELEPHONE ENCOUNTER
Patient states that he has been unable to make it to treatment this week due to inclement weather and he is unable to get out of his driveway. Pt plans to resume treatment next Tuesday. Dr. Nguyen notified.

## 2025-01-27 ENCOUNTER — HOSPITAL ENCOUNTER (OUTPATIENT)
Facility: HOSPITAL | Age: 70
Discharge: HOME | End: 2025-01-27

## 2025-01-27 ENCOUNTER — DOCUMENTATION (OUTPATIENT)
Dept: NUTRITION | Facility: HOSPITAL | Age: 70
End: 2025-01-27
Payer: MEDICARE

## 2025-01-27 ENCOUNTER — HOSPITAL ENCOUNTER (OUTPATIENT)
Facility: HOSPITAL | Age: 70
Discharge: HOME | End: 2025-01-27
Payer: MEDICARE

## 2025-01-27 VITALS — BODY MASS INDEX: 32.19 KG/M2 | WEIGHT: 218 LBS

## 2025-01-27 LAB
RAD ONC ARIA COURSE ID: 1
RAD ONC ARIA COURSE INTENT: NORMAL
RAD ONC ARIA COURSE LAST TREATMENT DATE: NORMAL
RAD ONC ARIA COURSE START DATE: NORMAL
RAD ONC ARIA COURSE TREATMENT ELAPSED DAYS: 63
RAD ONC ARIA FIRST TREATMENT DATE: NORMAL
RAD ONC ARIA PLAN FRACTIONS TREATED TO DATE: 27
RAD ONC ARIA PLAN ID: NORMAL
RAD ONC ARIA PLAN NAME: NORMAL
RAD ONC ARIA PLAN PRESCRIBED DOSE PER FRACTION: 2.5 GY
RAD ONC ARIA PLAN PRIMARY REFERENCE POINT: NORMAL
RAD ONC ARIA PLAN TOTAL FRACTIONS PRESCRIBED: 28
RAD ONC ARIA PLAN TOTAL PRESCRIBED DOSE: 7000 CGY
RAD ONC ARIA REFERENCE POINT DOSAGE GIVEN TO DATE: 48.6 GY
RAD ONC ARIA REFERENCE POINT DOSAGE GIVEN TO DATE: 67.5 GY
RAD ONC ARIA REFERENCE POINT ID: NORMAL
RAD ONC ARIA REFERENCE POINT ID: NORMAL
RAD ONC ARIA REFERENCE POINT SESSION DOSAGE GIVEN: 1.8 GY
RAD ONC ARIA REFERENCE POINT SESSION DOSAGE GIVEN: 2.5 GY

## 2025-01-27 PROCEDURE — 77385: CPT | Performed by: RADIOLOGY

## 2025-01-27 NOTE — PROGRESS NOTES
ONC Nutrition     Diagnosis: Prostate Cancer     Radiation: 70 Hernandez delivered in 28 daily fractions      Weight:  8/30/24- 218#  11/5/24- 219#  11/27/24- 218#  12/5/24- 218#  12/12/24- 218#  12/26/24- 218#  1/2/25- 219#  1/27/25- 218#     Patient completed 27/28 treatments today.       Patient reports the past few weeks have been great. Patient reports no new nutrition impact symptoms at this time.      All questions/concerns addressed at this time.      Will follow as indicated.      Ana Reynoso, RD, LD

## 2025-01-28 ENCOUNTER — HOSPITAL ENCOUNTER (OUTPATIENT)
Facility: HOSPITAL | Age: 70
Discharge: HOME | End: 2025-01-28

## 2025-01-28 LAB
RAD ONC ARIA COURSE ID: 1
RAD ONC ARIA COURSE INTENT: NORMAL
RAD ONC ARIA COURSE LAST TREATMENT DATE: NORMAL
RAD ONC ARIA COURSE START DATE: NORMAL
RAD ONC ARIA COURSE TREATMENT ELAPSED DAYS: 64
RAD ONC ARIA FIRST TREATMENT DATE: NORMAL
RAD ONC ARIA PLAN FRACTIONS TREATED TO DATE: 28
RAD ONC ARIA PLAN ID: NORMAL
RAD ONC ARIA PLAN NAME: NORMAL
RAD ONC ARIA PLAN PRESCRIBED DOSE PER FRACTION: 2.5 GY
RAD ONC ARIA PLAN PRIMARY REFERENCE POINT: NORMAL
RAD ONC ARIA PLAN TOTAL FRACTIONS PRESCRIBED: 28
RAD ONC ARIA PLAN TOTAL PRESCRIBED DOSE: 7000 CGY
RAD ONC ARIA REFERENCE POINT DOSAGE GIVEN TO DATE: 50.4 GY
RAD ONC ARIA REFERENCE POINT DOSAGE GIVEN TO DATE: 70 GY
RAD ONC ARIA REFERENCE POINT ID: NORMAL
RAD ONC ARIA REFERENCE POINT ID: NORMAL
RAD ONC ARIA REFERENCE POINT SESSION DOSAGE GIVEN: 1.8 GY
RAD ONC ARIA REFERENCE POINT SESSION DOSAGE GIVEN: 2.5 GY

## 2025-01-28 PROCEDURE — 77336 RADIATION PHYSICS CONSULT: CPT | Performed by: RADIOLOGY

## 2025-01-28 PROCEDURE — 77385: CPT | Performed by: RADIOLOGY

## 2025-02-12 LAB
RAD ONC ARIA COURSE END DATE: NORMAL
RAD ONC ARIA COURSE ID: 1
RAD ONC ARIA COURSE INTENT: NORMAL
RAD ONC ARIA COURSE LAST TREATMENT DATE: NORMAL
RAD ONC ARIA COURSE START DATE: NORMAL
RAD ONC ARIA COURSE TREATMENT ELAPSED DAYS: 64
RAD ONC ARIA FIRST TREATMENT DATE: NORMAL
RAD ONC ARIA PLAN FRACTIONS TREATED TO DATE: 28
RAD ONC ARIA PLAN ID: NORMAL
RAD ONC ARIA PLAN NAME: NORMAL
RAD ONC ARIA PLAN PRESCRIBED DOSE PER FRACTION: 2.5 GY
RAD ONC ARIA PLAN PRIMARY REFERENCE POINT: NORMAL
RAD ONC ARIA PLAN TOTAL FRACTIONS PRESCRIBED: 28
RAD ONC ARIA PLAN TOTAL PRESCRIBED DOSE: 7000 CGY
RAD ONC ARIA REFERENCE POINT DOSAGE GIVEN TO DATE: 50.4 GY
RAD ONC ARIA REFERENCE POINT DOSAGE GIVEN TO DATE: 70 GY
RAD ONC ARIA REFERENCE POINT ID: NORMAL
RAD ONC ARIA REFERENCE POINT ID: NORMAL

## 2025-02-28 ENCOUNTER — HOSPITAL ENCOUNTER (OUTPATIENT)
Dept: RADIATION ONCOLOGY | Facility: HOSPITAL | Age: 70
Setting detail: RADIATION/ONCOLOGY SERIES
Discharge: HOME | End: 2025-02-28
Payer: MEDICARE

## 2025-02-28 ENCOUNTER — CLINICAL SUPPORT (OUTPATIENT)
Dept: RADIATION ONCOLOGY | Facility: HOSPITAL | Age: 70
End: 2025-02-28
Payer: MEDICARE

## 2025-02-28 DIAGNOSIS — C61 PROSTATE CANCER: Primary | ICD-10-CM

## 2025-02-28 NOTE — PROGRESS NOTES
TELEMEDICINE FOLLOW UP NOTE    PATIENT:                                                      Cullen Harris  MEDICAL RECORD #:                        [id number]  :                                                          1955  COMPLETION DATE:   2025  DIAGNOSIS:     Oligometastatic prostate cancer  - Stage CARLY (cT1c, cN1, cM0, PSA: 9.1, Grade Group: 4)    This visit has been converted to a telehealth virtual visit, the patient's preferred method for today's follow-up.  Total time of discussion was 10 minutes.  The patient has given verbal consent.      BRIEF HISTORY: Initial follow-up visit.  The patient was initially felt to have clinically localized but higher grade prostate cancer, [place]'s 4+4 = 8, clinical stage IIC (T1c, N0, M0) with PSA 9.13 ng/ml.  Nuclear medicine bone scan showed no evidence of metastatic disease.  The patient was initially planned to undergo definitive treatment with a course of CyberKnife stereotactic body radiotherapy.  However, the patient was identified on planning MRI of the pelvis to have a suspicious right pelvic lymph node.  PSMA PET/CT evaluation showed uptake in the prostate in addition to hypermetabolism in a solitary 0.9 cm right external iliac lymph node, SUV 84 consistent with lymph node metastasis.  No other sites of disease were identified.  Because this finding upstaged the patient, CyberKnife was no longer a viable option for treatment.  The patient was recommended for moderately hypofractionated IMRT pelvic irradiation and concurrent short course androgen ablation.  The prostate gland and pathologic right iliac lymph node received a dose of 70 Gray delivered in 28 daily fractions.  Concurrently, the remainder of the pelvic lymph nodes received a minimum dose of 50 Gray using simultaneous integrated boost.  Treatments were completed on 2025.  The patient tolerated treatment well.  Mild fatigue following treatment is subsiding.  Other than some mild  exacerbation of urinary frequency, he denies notable change in his baseline, chronic urinary outflow obstructive symptoms which have been managed with longstanding use of Flomax.  Additionally, he was prescribed alfuzosin during treatment.  He denies gross hematuria, dysuria, or urinary incontinence.  He reports bowel function is normal.  He denies loose stool, hematochezia, or additional GI complaints.  He reports tolerable, occasional hot flashes which he relates to androgen ablation.  He is currently receiving Casodex and has received two 3-month LHRH agonist injections in 11/2024 and 2/2025.        MEDICATIONS: Medication reconciliation for the patient was reviewed and confirmed in the electronic medical record.    Review of Systems   Constitutional:  Positive for fatigue.   HENT:   Positive for hearing loss.    Endocrine: Positive for hot flashes.   Genitourinary:  Positive for frequency.    Musculoskeletal:  Positive for back pain.   All other systems reviewed and are negative.          KPS 80%      Physical Exam  Pulmonary:      Respirations even, unlabored. No audible wheezing or cough.  Neurological:      A+Ox4, conversant, answers questions appropriately.  Psychiatric:     Judgement, affect, and decision-making WNL.    Limited physical exam as visit was conducted remotely via telephone.          The following portions of the patient's history were reviewed and updated as appropriate: allergies, current medications, past family history, past medical history, past social history, past surgical history and problem list.         Diagnoses and all orders for this visit:    1. Prostate cancer (Primary)  -     MRI Pelvis With & Without Contrast; Future         IMPRESSION:  Prostate cancer, [place]'s 4+4 = 8, clinical stage CARLY (T1c, N1, M0), PSA 9.13 ng/ml.  The patient was upstaged following planning MRI and PSMA PET which identified a solitary right external iliac lymph node metastasis.  1 month status post  definitive course of moderately hypofractionated IMRT pelvic irradiation along with concurrent short course androgen ablation.  He tolerated treatment well.  Grade 1 fatigue and grade 1 urinary outflow obstructive symptoms continue to subside.  At least for now, he will continue longstanding use of daily Flomax.  He was encouraged to trial taper/discontinuation of alfuzosin as tolerated.  He reports good overall tolerance of androgen ablation.    Mr. Harris and I have reviewed the survivorship care plan in detail.  We discussed diagnosis, follow-up intervals, biannual PSA monitoring, and expectations for response to treatment.  A copy of the care plan has been mailed to the patient.  A copy has also been sent to his PCP.      RECOMMENDATIONS: Continue routine urologic surveillance under the care of Dr. Cortez, with follow-up and repeat PSA scheduled in 5/2025.  Will have the patient return to our clinic in 3 months with repeat pelvic MRI for comparison.  Assuming good biochemical and radiographic response to treatment, then I suspect urology will continue serial PSA monitoring, and imaging can be performed only as clinically indicated.      Return in about 3 months (around 5/28/2025) for Office Visit.    CAMMIE Mauricio      I spent a total of 45 minutes on today's visit, with more than 10 minutes in virtual communication with the patient via telephone, and the remainder of the time spent in reviewing the relevant history, records, available imaging, and for documentation.

## 2025-03-14 ENCOUNTER — TELEPHONE (OUTPATIENT)
Dept: RADIATION ONCOLOGY | Facility: HOSPITAL | Age: 70
End: 2025-03-14
Payer: MEDICARE

## 2025-03-14 DIAGNOSIS — C61 PROSTATE CANCER: Primary | ICD-10-CM

## 2025-03-14 NOTE — TELEPHONE ENCOUNTER
Attempt to reach patient, no answer/left voicemail.  Insurance denied order for MRI pelvis. Alternative insurance suggestion was MRI prostate.  After discussing with Dr. Nguyen, the decision was made to order a CT abdomen/pelvis (order placed), which will be performed prior to his follow up visit.

## 2025-06-04 ENCOUNTER — HOSPITAL ENCOUNTER (OUTPATIENT)
Facility: HOSPITAL | Age: 70
Setting detail: RADIATION/ONCOLOGY SERIES
End: 2025-06-04
Payer: MEDICARE

## 2025-06-05 ENCOUNTER — OFFICE VISIT (OUTPATIENT)
Age: 70
End: 2025-06-05
Payer: MEDICARE

## 2025-06-05 ENCOUNTER — HOSPITAL ENCOUNTER (OUTPATIENT)
Facility: HOSPITAL | Age: 70
Discharge: HOME | End: 2025-06-05
Admitting: NURSE PRACTITIONER
Payer: MEDICARE

## 2025-06-05 VITALS
OXYGEN SATURATION: 95 % | WEIGHT: 223.8 LBS | BODY MASS INDEX: 33.15 KG/M2 | RESPIRATION RATE: 14 BRPM | HEIGHT: 69 IN | TEMPERATURE: 98.4 F | DIASTOLIC BLOOD PRESSURE: 65 MMHG | SYSTOLIC BLOOD PRESSURE: 97 MMHG | HEART RATE: 89 BPM

## 2025-06-05 DIAGNOSIS — C61 PROSTATE CANCER: Primary | ICD-10-CM

## 2025-06-05 DIAGNOSIS — C61 PROSTATE CANCER: ICD-10-CM

## 2025-06-05 PROCEDURE — 25510000001 IOPAMIDOL 61 % SOLUTION: Performed by: NURSE PRACTITIONER

## 2025-06-05 PROCEDURE — G0463 HOSPITAL OUTPT CLINIC VISIT: HCPCS

## 2025-06-05 PROCEDURE — 74177 CT ABD & PELVIS W/CONTRAST: CPT

## 2025-06-05 RX ORDER — CHLORAL HYDRATE 500 MG
1000 CAPSULE ORAL DAILY
COMMUNITY

## 2025-06-05 RX ORDER — IOPAMIDOL 612 MG/ML
100 INJECTION, SOLUTION INTRAVASCULAR
Status: COMPLETED | OUTPATIENT
Start: 2025-06-05 | End: 2025-06-05

## 2025-06-05 RX ORDER — FLUTICASONE PROPIONATE 50 MCG
1 SPRAY, SUSPENSION (ML) NASAL DAILY
COMMUNITY

## 2025-06-05 RX ORDER — CHOLECALCIFEROL (VITAMIN D3) 25 MCG
1000 TABLET ORAL DAILY
COMMUNITY

## 2025-06-05 RX ADMIN — IOPAMIDOL 85 ML: 612 INJECTION, SOLUTION INTRAVENOUS at 13:34

## 2025-06-05 NOTE — PROGRESS NOTES
FOLLOW UP NOTE    PATIENT:                                                      Cullen Harris  MEDICAL RECORD #:                        [id number]  :                                                          1955  COMPLETION DATE:   2025  DIAGNOSIS:     Prostate cancer  - Stage IIC (cT1c, cN0, cM0, PSA: 9.1, Grade Group: 4)  - Stage CARLY (cT1c, cN1, cM0, PSA: 9.1, Grade Group: 4)      BRIEF HISTORY:    Routine follow-up visit.  He has high risk prostate cancer treated with pelvic irradiation and concurrent androgen ablation.  Since completion of radiotherapy has had significant improvement in urinary voiding.  He still has some urinary frequency and nocturia.  No dysuria.  No hematuria.  He is still using alfuzosin every morning and tamsulosin every evening.  This is causing some orthostatic hypotensive symptoms.  Bowel function normal.  PSA was undetectable with value less than 0.13 ng/mL on 2020.  CT abdomen pelvis performed earlier today shows enlarged prostate gland but no evidence of direct extraprostatic extension of disease.  The previously noted right external iliac lymph node is markedly reduced in size and now measures 0.4 cm diameter.    MEDICATIONS: Medication reconciliation for the patient was reviewed and confirmed in the electronic medical record.    Review of Systems   Constitutional:  Positive for fatigue. Negative for appetite change, chills, fever and unexpected weight change.   HENT:  Negative.     Eyes: Negative.    Respiratory:  Negative for cough and shortness of breath.    Cardiovascular:  Negative for chest pain and leg swelling.   Gastrointestinal:  Negative for nausea and vomiting.   Genitourinary:  Positive for difficulty urinating, frequency and nocturia. Negative for dysuria.    Musculoskeletal:  Positive for arthralgias and myalgias.   Skin: Negative.    Neurological:  Positive for light-headedness (intermittent with positional changes). Negative for dizziness and  headaches.   Hematological:  Bruises/bleeds easily.   Psychiatric/Behavioral:  Positive for sleep disturbance (due to nocturia). Negative for depression. The patient is not nervous/anxious.        Karnofsky score: 100     Physical Exam  Vitals and nursing note reviewed.   Constitutional:       Appearance: He is well-developed.   HENT:      Head: Normocephalic and atraumatic.   Cardiovascular:      Rate and Rhythm: Normal rate and regular rhythm.      Heart sounds: Normal heart sounds. No murmur heard.  Pulmonary:      Effort: Pulmonary effort is normal.      Breath sounds: Normal breath sounds. No wheezing or rales.   Abdominal:      General: Bowel sounds are normal. There is no distension.      Palpations: Abdomen is soft.      Tenderness: There is no abdominal tenderness.   Genitourinary:     Prostate: Not enlarged (Flat, smooth, no induration or nodularity.), not tender and no nodules present.      Rectum: No mass, tenderness, anal fissure, external hemorrhoid or internal hemorrhoid. Normal anal tone.   Musculoskeletal:         General: No tenderness. Normal range of motion.      Cervical back: Normal range of motion and neck supple.   Lymphadenopathy:      Cervical: No cervical adenopathy.      Upper Body:      Right upper body: No supraclavicular adenopathy.      Left upper body: No supraclavicular adenopathy.   Skin:     General: Skin is warm and dry.      Findings: Rash (Red dry bilateral midline surface gluteal dermatitis with grade 1 skin breakdown on the left) present.   Neurological:      Mental Status: He is alert and oriented to person, place, and time.      Sensory: No sensory deficit.   Psychiatric:         Behavior: Behavior normal.         Thought Content: Thought content normal.         Judgment: Judgment normal.         VITAL SIGNS:   Vitals:    06/05/25 1419   BP: 97/65   Pulse: 89   Resp: 14   Temp: 98.4 °F (36.9 °C)   TempSrc: Oral   SpO2: 95%   Weight: 102 kg (223 lb 12.8 oz)   Height: 175.3  "cm (69\")   PainSc: 0-No pain       IPSS Questionnaire (AUA-7):  Over the past month…    1)  Incomplete Emptying  How often have you had a sensation of not emptying your bladder?  3 - About half the time   2)  Frequency  How often have you had to urinate less than every two hours? 4 - More than half the time   3)  Intermittency  How often have you found you stopped and started again several times when you urinated?  2 - Less than half the time   4) Urgency  How often have you found it difficult to postpone urination?  3 - About half the time   5) Weak Stream  How often have you had a weak urinary stream?  3 - About half the time   6) Straining  How often have you had to push or strain to begin urination?  2 - Less than half the time   7) Nocturia  How many times did you typically get up at night to urinate?  3 - 3 times   Total Score:  20       Quality of life due to urinary symptoms:  If you were to spend the rest of your life with your urinary condition the way it is now, how would you feel about that? 4-Mostly Dissatisfied   Urine Leakage (Incontinence) 1-Mild (A few drops a day, no pad use)     Sexual Health Inventory  Current Status    1)  How do you rate your confidence that you could achieve and keep an erection? 1-Very Low   2) When you had erections with sexual stimulation, how often were your erections hard enough for penetration (entering your partner)? 0-No sexual activity   3)  During sexual intercourse, how often were you able to maintain your erection after you had penetrated (entered) into your partner? 0-Did not attempt intercourse   4) During sexual intercourse, how difficult was it to maintain your erection to completion of intercourse? 0-Did not attempt intercourse   5) When you attempted sexual intercourse, how often was it satisfactory to you? 0-No sexual activity   Total Score: 1       Bowel Health Inventory  Current Status: 0-No problems, no rectal bleeding, no discharge, less then 5 bowel " movements a day           Karnofsky Score: 90%        The following portions of the patient's history were reviewed and updated as appropriate: allergies, current medications, past family history, past medical history, past social history, past surgical history and problem list.         Diagnoses and all orders for this visit:    1. Prostate cancer (Primary)         IMPRESSION:  Prostate cancer, [place]'s 4+4 = 8, clinical stage CARLY (T1c, N1, M0), PSA 9.13 ng/ml.  The patient was upstaged following MRI and PSMA PET which identified a solitary right external iliac lymph node metastasis.  He is now 4 months status post definitive course of moderately hypofractionated IMRT pelvic irradiation along with concurrent short course androgen ablation.  He tolerated treatment well.  He has had a very good clinical and biochemical treatment response.    Orthostatic symptoms likely due to alpha-blocker.    Grade 1 gluteal decubitus.  He is very inactive.  He is applying topical ointment.    RECOMMENDATIONS: He will continue androgen ablation and PSA testing under the care of his urologist, Dr. Cortez  He will discontinue Flomax to try to reduce his orthostatic symptoms but continue alfuzosin.  He will try to increase activity and decrease prolonged sitting, as tolerated.  If the gluteal skin change does not begin to improve he will contact his primary physician.  Annual follow-up with me.    I spent a total of 25 minutes on todays visit, with more than 20 minutes in direct face to face communication, and the remainder of the time spent in reviewing the relevant history, records, available imaging, and for documentation.    Return in about 1 year (around 6/5/2026) for Office Visit.    Torsten Nguyen MD